# Patient Record
Sex: MALE | Race: WHITE | NOT HISPANIC OR LATINO | Employment: FULL TIME | URBAN - METROPOLITAN AREA
[De-identification: names, ages, dates, MRNs, and addresses within clinical notes are randomized per-mention and may not be internally consistent; named-entity substitution may affect disease eponyms.]

---

## 2017-03-09 ENCOUNTER — PATIENT MESSAGE (OUTPATIENT)
Dept: INTERNAL MEDICINE | Facility: CLINIC | Age: 27
End: 2017-03-09

## 2017-03-09 ENCOUNTER — HOSPITAL ENCOUNTER (OUTPATIENT)
Dept: RADIOLOGY | Facility: HOSPITAL | Age: 27
Discharge: HOME | End: 2017-03-09
Attending: FAMILY MEDICINE
Payer: COMMERCIAL

## 2017-03-09 ENCOUNTER — OFFICE VISIT (OUTPATIENT)
Dept: INTERNAL MEDICINE | Facility: CLINIC | Age: 27
End: 2017-03-09
Payer: COMMERCIAL

## 2017-03-09 VITALS
SYSTOLIC BLOOD PRESSURE: 126 MMHG | HEART RATE: 74 BPM | DIASTOLIC BLOOD PRESSURE: 80 MMHG | BODY MASS INDEX: 26.4 KG/M2 | TEMPERATURE: 98 F | RESPIRATION RATE: 16 BRPM | HEIGHT: 66 IN | WEIGHT: 164.25 LBS

## 2017-03-09 DIAGNOSIS — M79.672 LEFT FOOT PAIN: Primary | ICD-10-CM

## 2017-03-09 DIAGNOSIS — M79.672 LEFT FOOT PAIN: ICD-10-CM

## 2017-03-09 PROCEDURE — 99213 OFFICE O/P EST LOW 20 MIN: CPT | Mod: S$GLB,,, | Performed by: FAMILY MEDICINE

## 2017-03-09 PROCEDURE — 73630 X-RAY EXAM OF FOOT: CPT | Mod: 26,LT,, | Performed by: RADIOLOGY

## 2017-03-09 PROCEDURE — 99999 PR PBB SHADOW E&M-EST. PATIENT-LVL III: CPT | Mod: PBBFAC,,, | Performed by: FAMILY MEDICINE

## 2017-03-09 PROCEDURE — 1160F RVW MEDS BY RX/DR IN RCRD: CPT | Mod: S$GLB,,, | Performed by: FAMILY MEDICINE

## 2017-03-09 PROCEDURE — 73630 X-RAY EXAM OF FOOT: CPT | Mod: TC,PO,LT

## 2017-03-09 RX ORDER — IBUPROFEN 200 MG
400 TABLET ORAL EVERY 6 HOURS PRN
COMMUNITY
End: 2017-04-28

## 2017-03-09 RX ORDER — DICLOFENAC SODIUM 75 MG/1
75 TABLET, DELAYED RELEASE ORAL 2 TIMES DAILY
Qty: 30 TABLET | Refills: 1 | Status: SHIPPED | OUTPATIENT
Start: 2017-03-09 | End: 2017-04-28 | Stop reason: ALTCHOICE

## 2017-03-09 NOTE — PROGRESS NOTES
"Subjective:   Patient ID: Amanuel Jimenez is a 27 y.o. male.    Chief Complaint: other (left foot pain x 5 days)      Foot Injury    The incident occurred 5 to 7 days ago. The incident occurred in the street. There was no injury mechanism. The pain is present in the left foot. The pain is at a severity of 6/10. The pain is moderate. The pain has been improving since onset. Associated symptoms include an inability to bear weight. Pertinent negatives include no loss of motion, loss of sensation, muscle weakness, numbness or tingling. The symptoms are aggravated by movement, palpation and weight bearing. He has tried ice and NSAIDs for the symptoms. The treatment provided mild relief.       Patient queried and denies any further complaints.      ALLERGIES AND MEDICATIONS: updated and reviewed.  Review of patient's allergies indicates:   Allergen Reactions    Tylenol [acetaminophen] Nausea Only       Current Outpatient Prescriptions:     ibuprofen (ADVIL,MOTRIN) 200 MG tablet, Take 400 mg by mouth every 6 (six) hours as needed for Pain., Disp: , Rfl:     diclofenac (VOLTAREN) 75 MG EC tablet, Take 1 tablet (75 mg total) by mouth 2 (two) times daily., Disp: 30 tablet, Rfl: 1    Review of Systems   Constitutional: Negative for chills and fever.   Respiratory: Negative for choking and shortness of breath.    Cardiovascular: Negative for chest pain and leg swelling.   Musculoskeletal: Negative for back pain.   Neurological: Negative for tingling and numbness.       Objective:     Vitals:    03/09/17 1004   BP: 126/80   Pulse: 74   Resp: 16   Temp: 98.1 °F (36.7 °C)   TempSrc: Oral   Weight: 74.5 kg (164 lb 3.9 oz)   Height: 5' 5.5" (1.664 m)   PainSc:   3   PainLoc: Foot     Body mass index is 26.92 kg/(m^2).    Physical Exam   Constitutional: He appears well-developed and well-nourished.   HENT:   Head: Normocephalic and atraumatic.   Right Ear: External ear normal.   Left Ear: External ear normal.   Nose: Nose normal. "   Mouth/Throat: Oropharynx is clear and moist.   Cardiovascular: Normal rate and regular rhythm.    Pulmonary/Chest: Effort normal and breath sounds normal.   Musculoskeletal:        Right ankle: Normal.        Left ankle: Normal.        Right foot: Normal.        Left foot: Normal.   Nursing note and vitals reviewed.      Assessment and Plan:   Amanuel ARCHIBALD was seen today for other.    Diagnoses and all orders for this visit:    Left foot pain  -     Cancel: X-Ray Foot 2 View Left; Future    Other orders  -     diclofenac (VOLTAREN) 75 MG EC tablet; Take 1 tablet (75 mg total) by mouth 2 (two) times daily.        Return in about 1 week (around 3/16/2017), or if symptoms worsen or fail to improve.    THIS NOTE WILL BE SHARED WITH THE PATIENT.

## 2017-03-24 DIAGNOSIS — Z00.00 ROUTINE GENERAL MEDICAL EXAMINATION AT A HEALTH CARE FACILITY: Primary | ICD-10-CM

## 2017-04-18 ENCOUNTER — LAB VISIT (OUTPATIENT)
Dept: LAB | Facility: HOSPITAL | Age: 27
End: 2017-04-18
Attending: FAMILY MEDICINE
Payer: COMMERCIAL

## 2017-04-18 DIAGNOSIS — Z00.00 ROUTINE GENERAL MEDICAL EXAMINATION AT A HEALTH CARE FACILITY: ICD-10-CM

## 2017-04-18 LAB
ALBUMIN SERPL BCP-MCNC: 4 G/DL
ALP SERPL-CCNC: 81 U/L
ALT SERPL W/O P-5'-P-CCNC: 42 U/L
ANION GAP SERPL CALC-SCNC: 11 MMOL/L
AST SERPL-CCNC: 24 U/L
BASOPHILS # BLD AUTO: 0.04 K/UL
BASOPHILS NFR BLD: 0.4 %
BILIRUB SERPL-MCNC: 0.4 MG/DL
BUN SERPL-MCNC: 11 MG/DL
CALCIUM SERPL-MCNC: 9.2 MG/DL
CHLORIDE SERPL-SCNC: 108 MMOL/L
CHOLEST/HDLC SERPL: 5.6 {RATIO}
CO2 SERPL-SCNC: 22 MMOL/L
CREAT SERPL-MCNC: 1 MG/DL
DIFFERENTIAL METHOD: ABNORMAL
EOSINOPHIL # BLD AUTO: 0.6 K/UL
EOSINOPHIL NFR BLD: 6.7 %
ERYTHROCYTE [DISTWIDTH] IN BLOOD BY AUTOMATED COUNT: 13.7 %
EST. GFR  (AFRICAN AMERICAN): >60 ML/MIN/1.73 M^2
EST. GFR  (NON AFRICAN AMERICAN): >60 ML/MIN/1.73 M^2
GLUCOSE SERPL-MCNC: 98 MG/DL
HCT VFR BLD AUTO: 45.3 %
HDL/CHOLESTEROL RATIO: 17.9 %
HDLC SERPL-MCNC: 218 MG/DL
HDLC SERPL-MCNC: 39 MG/DL
HGB BLD-MCNC: 15.3 G/DL
LDLC SERPL CALC-MCNC: 146.4 MG/DL
LYMPHOCYTES # BLD AUTO: 2.4 K/UL
LYMPHOCYTES NFR BLD: 24.9 %
MCH RBC QN AUTO: 29.3 PG
MCHC RBC AUTO-ENTMCNC: 33.8 %
MCV RBC AUTO: 87 FL
MONOCYTES # BLD AUTO: 0.6 K/UL
MONOCYTES NFR BLD: 6.4 %
NEUTROPHILS # BLD AUTO: 5.8 K/UL
NEUTROPHILS NFR BLD: 61.2 %
NONHDLC SERPL-MCNC: 179 MG/DL
PLATELET # BLD AUTO: 248 K/UL
PMV BLD AUTO: 11.6 FL
POTASSIUM SERPL-SCNC: 4.4 MMOL/L
PROT SERPL-MCNC: 6.8 G/DL
RBC # BLD AUTO: 5.22 M/UL
SODIUM SERPL-SCNC: 141 MMOL/L
T4 FREE SERPL-MCNC: 1.02 NG/DL
TRIGL SERPL-MCNC: 163 MG/DL
TSH SERPL DL<=0.005 MIU/L-ACNC: 1.56 UIU/ML
WBC # BLD AUTO: 9.46 K/UL

## 2017-04-18 PROCEDURE — 80061 LIPID PANEL: CPT

## 2017-04-18 PROCEDURE — 84439 ASSAY OF FREE THYROXINE: CPT

## 2017-04-18 PROCEDURE — 84443 ASSAY THYROID STIM HORMONE: CPT

## 2017-04-18 PROCEDURE — 36415 COLL VENOUS BLD VENIPUNCTURE: CPT | Mod: PO

## 2017-04-18 PROCEDURE — 85025 COMPLETE CBC W/AUTO DIFF WBC: CPT

## 2017-04-18 PROCEDURE — 80053 COMPREHEN METABOLIC PANEL: CPT

## 2017-04-28 ENCOUNTER — OFFICE VISIT (OUTPATIENT)
Dept: INTERNAL MEDICINE | Facility: CLINIC | Age: 27
End: 2017-04-28
Payer: COMMERCIAL

## 2017-04-28 VITALS
HEART RATE: 76 BPM | BODY MASS INDEX: 28.02 KG/M2 | HEIGHT: 65 IN | TEMPERATURE: 98 F | SYSTOLIC BLOOD PRESSURE: 120 MMHG | DIASTOLIC BLOOD PRESSURE: 70 MMHG | WEIGHT: 168.19 LBS

## 2017-04-28 DIAGNOSIS — K61.0 PERIANAL ABSCESS: Primary | ICD-10-CM

## 2017-04-28 PROCEDURE — 1160F RVW MEDS BY RX/DR IN RCRD: CPT | Mod: S$GLB,,, | Performed by: FAMILY MEDICINE

## 2017-04-28 PROCEDURE — 99999 PR PBB SHADOW E&M-EST. PATIENT-LVL III: CPT | Mod: PBBFAC,,, | Performed by: FAMILY MEDICINE

## 2017-04-28 PROCEDURE — 99213 OFFICE O/P EST LOW 20 MIN: CPT | Mod: S$GLB,,, | Performed by: FAMILY MEDICINE

## 2017-04-28 RX ORDER — SULFAMETHOXAZOLE AND TRIMETHOPRIM 800; 160 MG/1; MG/1
1 TABLET ORAL 2 TIMES DAILY
Qty: 28 TABLET | Refills: 0 | Status: SHIPPED | OUTPATIENT
Start: 2017-04-28 | End: 2017-05-12

## 2017-05-01 NOTE — PROGRESS NOTES
"Subjective:   Patient ID: Amanuel Jimenez is a 27 y.o. male.    Chief Complaint: Mass (on he buttock)      HPI  28 yo male with "sore" near anus. Pain has improved significantly but he is concerned. No history of anal problems. No diarrhea. No fever or chills. No melena or hematochezia.  Patient queried and denies any further complaints.      ALLERGIES AND MEDICATIONS: updated and reviewed.  Review of patient's allergies indicates:   Allergen Reactions    Tylenol [acetaminophen] Nausea Only       Current Outpatient Prescriptions:     sulfamethoxazole-trimethoprim 800-160mg (BACTRIM DS) 800-160 mg Tab, Take 1 tablet by mouth 2 (two) times daily., Disp: 28 tablet, Rfl: 0    Review of Systems   Constitutional: Negative for activity change, appetite change, chills, diaphoresis, fatigue, fever and unexpected weight change.   HENT: Negative for congestion, ear discharge, ear pain, postnasal drip, rhinorrhea, sneezing and sore throat.    Eyes: Negative for photophobia and discharge.   Respiratory: Negative for cough, chest tightness, shortness of breath and wheezing.    Cardiovascular: Negative for chest pain and palpitations.   Gastrointestinal: Negative for abdominal distention, abdominal pain, diarrhea, nausea and vomiting.   Genitourinary: Negative for dysuria.   Musculoskeletal: Negative for arthralgias and neck pain.   Skin: Negative for rash.   Neurological: Negative for headaches.       Objective:     Vitals:    04/28/17 1354   BP: 120/70   Pulse: 76   Temp: 98.2 °F (36.8 °C)   TempSrc: Oral   Weight: 76.3 kg (168 lb 3.4 oz)   Height: 5' 5" (1.651 m)   PainSc: 0-No pain     Body mass index is 27.99 kg/(m^2).    Physical Exam   Constitutional: He appears well-developed and well-nourished.   HENT:   Head: Normocephalic and atraumatic.   Right Ear: Hearing, tympanic membrane, external ear and ear canal normal. No drainage or swelling. No decreased hearing is noted.   Left Ear: Hearing, tympanic membrane, external " ear and ear canal normal. No drainage or swelling. No decreased hearing is noted.   Nose: Nose normal. No rhinorrhea.   Mouth/Throat: Oropharynx is clear and moist. No oropharyngeal exudate, posterior oropharyngeal edema or posterior oropharyngeal erythema.   Eyes: Conjunctivae, EOM and lids are normal. Pupils are equal, round, and reactive to light. Right eye exhibits no discharge and no exudate. Left eye exhibits no discharge and no exudate. Right conjunctiva is not injected. Left conjunctiva is not injected.   Neck: Trachea normal and full passive range of motion without pain. Normal carotid pulses, no hepatojugular reflux and no JVD present. Carotid bruit is not present. No rigidity. No edema and no erythema present. No thyroid mass and no thyromegaly present.   Cardiovascular: Normal rate, regular rhythm and normal heart sounds.    Pulmonary/Chest: Effort normal. No respiratory distress.   Abdominal: Soft. Normal appearance and bowel sounds are normal. There is no tenderness. There is negative Charlton's sign.   Genitourinary:   Genitourinary Comments: Resolving lesion which appears to have been a small abscess   Lymphadenopathy:     He has no cervical adenopathy.   Neurological: He is alert.   Skin: Skin is warm and dry.   Psychiatric: He has a normal mood and affect. His speech is normal and behavior is normal.       Assessment and Plan:   Amanuel ARCHIBALD was seen today for mass.    Diagnoses and all orders for this visit:    Perianal abscess  -     Ambulatory consult to General Surgery    Other orders  -     sulfamethoxazole-trimethoprim 800-160mg (BACTRIM DS) 800-160 mg Tab; Take 1 tablet by mouth 2 (two) times daily.        No Follow-up on file.    THIS NOTE WILL BE SHARED WITH THE PATIENT.

## 2017-05-02 RX ORDER — HYDROCODONE BITARTRATE AND ACETAMINOPHEN 7.5; 325 MG/1; MG/1
1 TABLET ORAL EVERY 6 HOURS PRN
Qty: 20 TABLET | Refills: 0 | Status: ON HOLD | OUTPATIENT
Start: 2017-05-02 | End: 2017-05-15

## 2017-05-03 ENCOUNTER — TELEPHONE (OUTPATIENT)
Dept: INTERNAL MEDICINE | Facility: CLINIC | Age: 27
End: 2017-05-03

## 2017-05-03 NOTE — TELEPHONE ENCOUNTER
----- Message from Chey Mcdowell sent at 5/3/2017  2:29 PM CDT -----  Contact: Patient  The patient was seen on 4/28/17 for a mass on his buttocks.  He says it bursted last night.  He would like to know if he still needs to go see the surgeon.  Please call him at [phone].    Thanks!

## 2017-05-04 ENCOUNTER — OFFICE VISIT (OUTPATIENT)
Dept: SURGERY | Facility: CLINIC | Age: 27
End: 2017-05-04
Payer: COMMERCIAL

## 2017-05-04 VITALS
WEIGHT: 169.19 LBS | OXYGEN SATURATION: 100 % | HEART RATE: 80 BPM | SYSTOLIC BLOOD PRESSURE: 127 MMHG | TEMPERATURE: 98 F | HEIGHT: 65 IN | BODY MASS INDEX: 28.19 KG/M2 | DIASTOLIC BLOOD PRESSURE: 63 MMHG

## 2017-05-04 DIAGNOSIS — F17.200 SMOKER: ICD-10-CM

## 2017-05-04 DIAGNOSIS — K61.0 PERIANAL ABSCESS: Primary | ICD-10-CM

## 2017-05-04 DIAGNOSIS — E66.3 OVERWEIGHT (BMI 25.0-29.9): ICD-10-CM

## 2017-05-04 PROCEDURE — 99999 PR PBB SHADOW E&M-EST. PATIENT-LVL III: CPT | Mod: PBBFAC,,, | Performed by: SURGERY

## 2017-05-04 PROCEDURE — 1160F RVW MEDS BY RX/DR IN RCRD: CPT | Mod: S$GLB,,, | Performed by: SURGERY

## 2017-05-04 PROCEDURE — 99204 OFFICE O/P NEW MOD 45 MIN: CPT | Mod: S$GLB,,, | Performed by: SURGERY

## 2017-05-04 NOTE — LETTER
May 6, 2017      Daniel Mora MD  [address] [facility]  6th [address]  [place] [address]           [facility]  [address]  4th Floor [facility] [address]  Phone: [phone]          Patient: Amanuel Jimenez   MR Number: [id number]   YOB: 1990   Date of Visit: 5/4/2017       Dear Dr. Daniel Mora:    Thank you for referring Amanuel Jimenez to me for evaluation. Attached you will find relevant portions of my assessment and plan of care.    If you have questions, please do not hesitate to call me. I look forward to following Amanuel Jimenez along with you.    Sincerely,    Elis Jeong,     Enclosure  CC:  No Recipients    If you would like to receive this communication electronically, please contact [email or web address] or [phone] to request more information on [facility] [employer] access.    For providers and/or their staff who would like to refer a patient to [facility], please contact us through our one-stop-shop provider referral line, Fiorella Fay, at [phone].    If you feel you have received this communication in error or would no longer like to receive these types of communications, please e-mail [email or web address]

## 2017-05-04 NOTE — MR AVS SNAPSHOT
"    [facility]  [address] [place] [address]  4th Floor [place] [address]  Phone: [phone]                  Amanuel Jimenez   2017 10:20 AM   Office Visit    Description:  Male : 1990   Provider:  Elis Jeong DO   Department:  [facility]           Reason for Visit     Consult                To Do List           Goals (5 Years of Data)     None      [facility]     [facility] Care [facility] -  Assistance  Unless otherwise directed by your provider, please contact [facility], our nurse care line that is available for  assistance.     Registered nurses in the [facility] Center provide: appointment scheduling, clinical advisement, health education, and other advisory services.  Call: [phone] (toll free)               Medications           Message regarding Medications     Verify the changes and/or additions to your medication regime listed below are the same as discussed with your clinician today.  If any of these changes or additions are incorrect, please notify your healthcare provider.             Verify that the below list of medications is an accurate representation of the medications you are currently taking.  If none reported, the list may be blank. If incorrect, please contact your healthcare provider. Carry this list with you in case of emergency.           Current Medications     hydrocodone-acetaminophen 7.5-325mg (NORCO) 7.5-325 mg per tablet Take 1 tablet by mouth every 6 (six) hours as needed for Pain.    sulfamethoxazole-trimethoprim 800-160mg (BACTRIM DS) 800-160 mg Tab Take 1 tablet by mouth 2 (two) times daily.           Clinical Reference Information           Your Vitals Were     BP Pulse Temp Height Weight SpO2    127/63 80 98.1 °F (36.7 °C) 5' 5" (1.651 m) 76.8 kg (169 lb 3.3 oz) 100%    BMI                28.16 kg/m2          Blood Pressure          Most Recent Value    BP  127/63      Allergies as of " 5/4/2017     Tylenol [Acetaminophen]      Immunizations Administered on Date of Encounter - 5/4/2017     None      Smoking Cessation     If you would like to quit smoking:   You may be eligible for free services if you are a [place] resident and started smoking cigarettes before September 1, 1988.  Call the Smoking Cessation Trust (SCT) toll free at (600) 461-2539 or (380) 194-7420.   Call 1-800-QUIT-NOW if you do not meet the above criteria.   Contact us via email: [email or web address]   View our website for more information: [email or web address]        Language Assistance Services     ATTENTION: Language assistance services are available, free of charge. Please call [phone].      ATENCIÓN: Si asherla itz, tiene a baig disposición servicios gratuitos de asistencia lingüística. Llame al [phone].     CHÚ Ý: N?u b?n nói Ti?ng Vi?t, có các d?ch v? h? tr? ngôn ng? mi?n phí dành cho b?n. G?i s? [phone].         [facility] complies with applicable Federal civil rights laws and does not discriminate on the basis of race, color, national origin, age, disability, or sex.

## 2017-05-07 NOTE — PROGRESS NOTES
Subjective:      Patient ID: Amanuel Jimenez is a 27 y.o. male.    Chief Complaint: Consult   27-year-old otherwise healthy male presents for evaluation of perianal abscess.  He felt a lump in the perianal region after Easter, he believes April 17.  He initially thought it was hemorrhoids and he applied hemorrhoidal cream (Preparation H) this did not help and then he began applying Neosporin.  He noticed an increase in size in the lesion with corresponding increasing discomfort.  He then presented to his primary care doctor last Friday and antibiotics were prescribed.  2 days prior to his presentation today, the lesion began to spontaneously drain.  The mass has resolved and the pain is much improved.  There is rare drainage.  He is still taking antibiotics.  No fevers or chills presently, although he thought he may have had fevers and chills with no lesion when it was at its greatest size.  No nausea or vomiting.  He works as a .    History reviewed. No pertinent past medical history.  History reviewed. No pertinent surgical history.  History reviewed. No pertinent family history.  Social History     Social History    Marital status: Legally      Spouse name: N/A    Number of children: N/A    Years of education: N/A     Social History Main Topics    Smoking status: Current Every Day Smoker     Packs/day: 0.50     Types: Cigarettes    Smokeless tobacco: None    Alcohol use None    Drug use: None    Sexual activity: Not Asked     Other Topics Concern    None     Social History Narrative       Current Outpatient Prescriptions   Medication Sig Dispense Refill    hydrocodone-acetaminophen 7.5-325mg (NORCO) 7.5-325 mg per tablet Take 1 tablet by mouth every 6 (six) hours as needed for Pain. 20 tablet 0    sulfamethoxazole-trimethoprim 800-160mg (BACTRIM DS) 800-160 mg Tab Take 1 tablet by mouth 2 (two) times daily. 28 tablet 0     No current facility-administered medications for  "this visit.      Review of patient's allergies indicates:   Allergen Reactions    Tylenol [acetaminophen] Nausea Only       ROS:  All systems were reviewed and are negative, except that mentioned in the HPI.    Objective:     Vitals:    05/04/17 1035   BP: 127/63   Pulse: 80   Temp: 98.1 °F (36.7 °C)   SpO2: 100%   Weight: 76.8 kg (169 lb 3.3 oz)   Height: 5' 5" (1.651 m)     Physical Exam   Constitutional: He is oriented to person, place, and time. He appears well-developed and well-nourished. No distress.   HENT:   Head: Normocephalic and atraumatic.   Eyes: Conjunctivae and EOM are normal. Pupils are equal, round, and reactive to light. No scleral icterus.   Neck: Normal range of motion. Neck supple. No JVD present.   Cardiovascular: Normal rate and regular rhythm.    Pulmonary/Chest: Effort normal and breath sounds normal. No respiratory distress.   Abdominal: Soft. Bowel sounds are normal. He exhibits no distension.   Genitourinary:   Genitourinary Comments: 5-7 mm skin opening in the left perianal region, tender to palpation, no active drainage, no fluctuance, no crepitus, trace erythema at the actual opening.  I was unable to probe the wound due to the sensitivity of the patient.   Musculoskeletal: Normal range of motion. He exhibits no edema or tenderness.   Neurological: He is alert and oriented to person, place, and time. No cranial nerve deficit.   Skin: Skin is warm and dry. He is not diaphoretic.   Psychiatric: He has a normal mood and affect.       Lab Review: CBC:   Lab Results   Component Value Date    WBC 9.46 04/18/2017    RBC 5.22 04/18/2017    HGB 15.3 04/18/2017    HCT 45.3 04/18/2017     04/18/2017     BMP:   Lab Results   Component Value Date    GLU 98 04/18/2017     04/18/2017    K 4.4 04/18/2017     04/18/2017    CO2 22 (L) 04/18/2017    BUN 11 04/18/2017    CREATININE 1.0 04/18/2017    CALCIUM 9.2 04/18/2017     Lab Results   Component Value Date    ALT 42 04/18/2017    " AST 24 04/18/2017    ALKPHOS 81 04/18/2017    BILITOT 0.4 04/18/2017       Diagnostics Review:  n/a    Assessment:     1. Perianal abscess    2. Smoker    3. Overweight (BMI 25.0-29.9)      Plan:      The perianal abscess has spontaneously drained and the residual wound appears to be healing well.  I recommended that he apply warm compresses, or do warm soaks several times throughout the day.  He should complete his course of antibiotics.  Loosefitting cotton clothes were recommended.  He was told to keep the area clean and dry.  Follow-up appointment was offered, patient declined stating he'll follow-up with us as needed.  All of his questions were answered.  Smoking cessation is encouraged.

## 2017-05-12 ENCOUNTER — TELEPHONE (OUTPATIENT)
Dept: SURGERY | Facility: CLINIC | Age: 27
End: 2017-05-12

## 2017-05-12 ENCOUNTER — OFFICE VISIT (OUTPATIENT)
Dept: SURGERY | Facility: CLINIC | Age: 27
End: 2017-05-12
Payer: COMMERCIAL

## 2017-05-12 VITALS
HEART RATE: 73 BPM | SYSTOLIC BLOOD PRESSURE: 124 MMHG | BODY MASS INDEX: 27.78 KG/M2 | DIASTOLIC BLOOD PRESSURE: 70 MMHG | HEIGHT: 65 IN | WEIGHT: 166.75 LBS | TEMPERATURE: 98 F

## 2017-05-12 DIAGNOSIS — K62.9 RECTAL LESION: Primary | ICD-10-CM

## 2017-05-12 PROCEDURE — 1160F RVW MEDS BY RX/DR IN RCRD: CPT | Mod: S$GLB,,, | Performed by: SURGERY

## 2017-05-12 PROCEDURE — 99214 OFFICE O/P EST MOD 30 MIN: CPT | Mod: 57,S$GLB,, | Performed by: SURGERY

## 2017-05-12 PROCEDURE — 99999 PR PBB SHADOW E&M-EST. PATIENT-LVL IV: CPT | Mod: PBBFAC,,, | Performed by: SURGERY

## 2017-05-12 RX ORDER — METRONIDAZOLE 500 MG/100ML
500 INJECTION, SOLUTION INTRAVENOUS
Status: CANCELLED | OUTPATIENT
Start: 2017-05-12

## 2017-05-12 RX ORDER — SODIUM CHLORIDE 9 MG/ML
INJECTION, SOLUTION INTRAVENOUS CONTINUOUS
Status: CANCELLED | OUTPATIENT
Start: 2017-05-12

## 2017-05-12 NOTE — TELEPHONE ENCOUNTER
----- Message from Jennaaddison Oreilly sent at 5/12/2017  4:04 PM CDT -----  Contact: [phone]/self  Patient requesting to speak with you regarding time his surgery. Please advise.    05/12/17  1611  Spoke to pt who states he was wondering when he was going to get the call to come in for surgery. Pt informed that someone would give him a call, and that I had spoken to the surgical coordinator who states the nurse was making her way down the list, and that he would be receiving a call. Pt states will someone call me before Monday. I assured pt that someone would give him a call with time of arrival before Monday. Pt verbalized understanding.

## 2017-05-12 NOTE — PROGRESS NOTES
History & Physical    SUBJECTIVE:     History of Present Illness:  Patient known to Dr. Jeong, seen 5/4 with rectal complaints, concern of abscess spontaneously draining. Today was his last day of Bactrim.     This morning he woke up with increased pain and concern of recurrence.  This has been ongoing since 4/17.  The pain is described as throbbing, and is 4/10 in intensity. The patient is experiencing rectal pain without radiation. Onset was a few days ago. Symptoms have been gradually worsening. Aggravating factors: pressure.  Alleviating factors: none. Associated symptoms: none. The patient denies constipation, diarrhea, dysuria, hematochezia, hematuria, nausea and vomiting.    Last bm today, no issues  No family history of IBD.   + tobacco use                Chief Complaint   Patient presents with    Rectal Pain       Review of patient's allergies indicates:   Allergen Reactions    Tylenol [acetaminophen] Nausea Only       Current Outpatient Prescriptions   Medication Sig Dispense Refill    hydrocodone-acetaminophen 7.5-325mg (NORCO) 7.5-325 mg per tablet Take 1 tablet by mouth every 6 (six) hours as needed for Pain. 20 tablet 0    sulfamethoxazole-trimethoprim 800-160mg (BACTRIM DS) 800-160 mg Tab Take 1 tablet by mouth 2 (two) times daily. 28 tablet 0     No current facility-administered medications for this visit.        History reviewed. No pertinent past medical history.  History reviewed. No pertinent surgical history.  History reviewed. No pertinent family history.  Social History   Substance Use Topics    Smoking status: Current Every Day Smoker     Packs/day: 0.50     Types: Cigarettes    Smokeless tobacco: None    Alcohol use None          Review of Systems   Constitutional: Negative for chills and fever.   HENT: Negative for congestion and sore throat.    Eyes: Negative for photophobia and visual disturbance.   Respiratory: Negative for cough and shortness of breath.    Cardiovascular:  "Negative for chest pain and palpitations.   Gastrointestinal: Positive for rectal pain. Negative for anal bleeding, blood in stool, constipation and diarrhea.   Genitourinary: Negative for dysuria, frequency and hematuria.   Musculoskeletal: Negative for back pain and gait problem.   Skin: Negative for rash and wound.   Neurological: Negative for seizures and headaches.   Hematological: Negative for adenopathy. Does not bruise/bleed easily.   Psychiatric/Behavioral: Negative for sleep disturbance. The patient is not nervous/anxious.        OBJECTIVE:     Vital Signs (Most Recent)  Temp: 98.2 °F (36.8 °C) (05/12/17 0905)  Pulse: 73 (05/12/17 0905)  BP: 124/70 (05/12/17 0905)  5' 5" (1.651 m)  75.7 kg (166 lb 12.5 oz)     Physical Exam   Constitutional: He is oriented to person, place, and time. He appears well-developed and well-nourished. No distress.   HENT:   Head: Normocephalic and atraumatic.   Eyes: Conjunctivae and EOM are normal. No scleral icterus.   Neck: Normal range of motion.   Cardiovascular: Normal rate and intact distal pulses.    Pulmonary/Chest: Effort normal. No stridor. No respiratory distress.   Abdominal: Soft. He exhibits no distension. There is no tenderness.   Genitourinary:   Genitourinary Comments: Left lateral location is nodularity without overlying skin changes and no fluctuance or drainage    KRISSY performed with grade 1 internal hemorrhoids noted, distal anal canal left lateral location is an additional nodularity which is tender and fluctuant   Musculoskeletal: Normal range of motion. He exhibits no edema or tenderness.   Neurological: He is alert and oriented to person, place, and time.   Skin: No rash noted. He is not diaphoretic. No pallor.   Psychiatric: He has a normal mood and affect. His behavior is normal.       Laboratory  n/a    Diagnostic Results:  n/a    ASSESSMENT/PLAN:   28 yo male with rectal abscess, concern of fistula- first presentation. No family history of IBD " however his age and tobacco use and left lateral fistula warrants EUA to appropriately define pathology.     Will stop abx to allow evolution of pathology, currently no evidence of infection.   Risks of surgery discussed including bleeding, infection, pain, scarring, wound complications, injury to local structures, and need for further surgery.  The patient demonstrated understanding of risks and consent signed.   Enema in PReop

## 2017-05-12 NOTE — TELEPHONE ENCOUNTER
05/12/17  0826  Spoke to pt who states, he will be here for 9am to see Dr. Sharif. Appt made for 9am on 05/12/17.

## 2017-05-12 NOTE — MR AVS SNAPSHOT
"    [facility]  [address] [place] [address]  4th [address] [place] [address]  Phone: [phone]                  Amanuel Jimenez   2017 9:00 AM   Office Visit    Description:  Male : 1990   Provider:  Kassidy Sharif MD   Department:  [facility]                To Do List           Goals (5 Years of Data)     None      [facility]     [facility] -  Assistance  Unless otherwise directed by your provider, please contact [facility], our nurse care line that is available for  assistance.     Registered nurses in the [facility] provide: appointment scheduling, clinical advisement, health education, and other advisory services.  Call: [phone] (toll free)               Medications           Message regarding Medications     Verify the changes and/or additions to your medication regime listed below are the same as discussed with your clinician today.  If any of these changes or additions are incorrect, please notify your healthcare provider.             Verify that the below list of medications is an accurate representation of the medications you are currently taking.  If none reported, the list may be blank. If incorrect, please contact your healthcare provider. Carry this list with you in case of emergency.           Current Medications     hydrocodone-acetaminophen 7.5-325mg (NORCO) 7.5-325 mg per tablet Take 1 tablet by mouth every 6 (six) hours as needed for Pain.    sulfamethoxazole-trimethoprim 800-160mg (BACTRIM DS) 800-160 mg Tab Take 1 tablet by mouth 2 (two) times daily.           Clinical Reference Information           Your Vitals Were     BP Pulse Temp Height Weight BMI    124/70 (BP Location: Left arm, Patient Position: Sitting) 73 98.2 °F (36.8 °C) (Oral) 5' 5" (1.651 m) 75.7 kg (166 lb 12.5 oz) 27.75 kg/m2      Blood Pressure          Most Recent Value    BP  124/70      Allergies as of 2017     Tylenol " [Acetaminophen]      Immunizations Administered on Date of Encounter - 5/12/2017     None      Smoking Cessation     If you would like to quit smoking:   You may be eligible for free services if you are a [place] resident and started smoking cigarettes before September 1, 1988.  Call the Smoking Cessation Trust (SCT) toll free at (564) 176-3782 or (510) 271-4194.   Call 1-800-QUIT-NOW if you do not meet the above criteria.   Contact us via email: [email or web address]   View our website for more information: [email or web address]        Language Assistance Services     ATTENTION: Language assistance services are available, free of charge. Please call [phone].      ATENCIÓN: Si habla itz, tiene a baig disposición servicios gratuitos de asistencia lingüística. Llame al [phone].     CHÚ Ý: N?u b?n nói Ti?ng Vi?t, có các d?ch v? h? tr? ngôn ng? mi?n phí dành cho b?n. G?i s? [phone].         [facility] complies with applicable Federal civil rights laws and does not discriminate on the basis of race, color, national origin, age, disability, or sex.

## 2017-05-12 NOTE — TELEPHONE ENCOUNTER
----- Message from Lovely Asher sent at 5/12/2017  7:03 AM CDT -----  Contact: self/[phone]  Patient would like to be seen today for a pre anal abscess.  Advise patient that Dr. Jeong isn't available today and he would like to speak with someone.  Please advise     05/12/17  0805  Pt states that he spoke to Dr. Jeong last week regarding abscess, and was told that they would monitor it. Pt states that the abscess came back, and will probably need to be drained. Pt reminded that Dr. Jeong is not in today, but that Dr. Sharif would be able to see him. Pt also informed that Dr. Sharif is currently not in at the moment, but would give him a call as soon as she gets here due to pt stating that he lives approx. 10-15 mins away, and not wanting him to sit in the waiting room for an undetermined amount of time. Pt verbalized understanding, and states that he will wait for my call.

## 2017-05-14 ENCOUNTER — ANESTHESIA EVENT (OUTPATIENT)
Dept: SURGERY | Facility: HOSPITAL | Age: 27
End: 2017-05-14
Payer: COMMERCIAL

## 2017-05-14 NOTE — ANESTHESIA PREPROCEDURE EVALUATION
05/14/2017  Amanuel Jimenez is a 27 y.o., male EUA    Review of patient's allergies indicates:   Allergen Reactions    Tylenol [acetaminophen] Nausea Only     No past medical history on file.  No past surgical history on file.    Patient Active Problem List   Diagnosis    Rectal lesion     Patient Active Problem List   Diagnosis    Rectal lesion       Anesthesia Evaluation    I have reviewed the Patient Summary Reports.        Review of Systems    Wt Readings from Last 3 Encounters:   05/12/17 75.7 kg (166 lb 12.5 oz)   05/04/17 76.8 kg (169 lb 3.3 oz)   04/28/17 76.3 kg (168 lb 3.4 oz)     Temp Readings from Last 3 Encounters:   05/12/17 36.8 °C (98.2 °F) (Oral)   05/04/17 36.7 °C (98.1 °F)   04/28/17 36.8 °C (98.2 °F) (Oral)     BP Readings from Last 3 Encounters:   05/12/17 124/70   05/04/17 127/63   04/28/17 120/70     Pulse Readings from Last 3 Encounters:   05/12/17 73   05/04/17 80   04/28/17 76     Lab Results   Component Value Date    WBC 9.46 04/18/2017    HGB 15.3 04/18/2017    HCT 45.3 04/18/2017    MCV 87 04/18/2017     04/18/2017       Chemistry        Component Value Date/Time     04/18/2017 0757    K 4.4 04/18/2017 0757     04/18/2017 0757    CO2 22 (L) 04/18/2017 0757    BUN 11 04/18/2017 0757    CREATININE 1.0 04/18/2017 0757    GLU 98 04/18/2017 0757        Component Value Date/Time    CALCIUM 9.2 04/18/2017 0757    ALKPHOS 81 04/18/2017 0757    AST 24 04/18/2017 0757    ALT 42 04/18/2017 0757    BILITOT 0.4 04/18/2017 0757            Physical Exam  General:  Well nourished            Mental Status:  Mental Status Findings:         Anesthesia Plan  Type of Anesthesia, risks & benefits discussed:  Anesthesia Type:  MAC  Patient's Preference:   Intra-op Monitoring Plan:   Intra-op Monitoring Plan Comments:   Post Op Pain Control Plan:   Post Op Pain Control Plan  Comments:   Induction:   IV  Beta Blocker:         Informed Consent: Patient understands risks and agrees with Anesthesia plan.  Questions answered. Anesthesia consent signed with patient.  ASA Score: 1     Day of Surgery Review of History & Physical:    H&P update referred to the surgeon.         Ready For Surgery From Anesthesia Perspective.

## 2017-05-15 ENCOUNTER — HOSPITAL ENCOUNTER (OUTPATIENT)
Facility: HOSPITAL | Age: 27
Discharge: HOME | End: 2017-05-15
Attending: SURGERY | Admitting: SURGERY
Payer: COMMERCIAL

## 2017-05-15 ENCOUNTER — ANESTHESIA (OUTPATIENT)
Dept: SURGERY | Facility: HOSPITAL | Age: 27
End: 2017-05-15
Payer: COMMERCIAL

## 2017-05-15 DIAGNOSIS — K62.9 RECTAL LESION: ICD-10-CM

## 2017-05-15 PROCEDURE — 25000003 PHARM REV CODE 250: Performed by: SURGERY

## 2017-05-15 PROCEDURE — 25000003 PHARM REV CODE 250: Performed by: ANESTHESIOLOGY

## 2017-05-15 PROCEDURE — 63600175 PHARM REV CODE 636 W HCPCS: Performed by: NURSE ANESTHETIST, CERTIFIED REGISTERED

## 2017-05-15 PROCEDURE — 37000009 HC ANESTHESIA EA ADD 15 MINS: Performed by: SURGERY

## 2017-05-15 PROCEDURE — 36000704 HC OR TIME LEV I 1ST 15 MIN: Performed by: SURGERY

## 2017-05-15 PROCEDURE — 63600175 PHARM REV CODE 636 W HCPCS: Performed by: SURGERY

## 2017-05-15 PROCEDURE — 71000015 HC POSTOP RECOV 1ST HR: Performed by: SURGERY

## 2017-05-15 PROCEDURE — 25000003 PHARM REV CODE 250: Performed by: NURSE ANESTHETIST, CERTIFIED REGISTERED

## 2017-05-15 PROCEDURE — 71000016 HC POSTOP RECOV ADDL HR: Performed by: SURGERY

## 2017-05-15 PROCEDURE — 46020 PLACEMENT OF SETON: CPT | Mod: ,,, | Performed by: SURGERY

## 2017-05-15 PROCEDURE — 46050 I&D PERIANAL ABSCESS SUPFC: CPT | Mod: 51,,, | Performed by: SURGERY

## 2017-05-15 PROCEDURE — 37000008 HC ANESTHESIA 1ST 15 MINUTES: Performed by: SURGERY

## 2017-05-15 PROCEDURE — 36000705 HC OR TIME LEV I EA ADD 15 MIN: Performed by: SURGERY

## 2017-05-15 RX ORDER — FENTANYL CITRATE 50 UG/ML
INJECTION, SOLUTION INTRAMUSCULAR; INTRAVENOUS
Status: DISCONTINUED | OUTPATIENT
Start: 2017-05-15 | End: 2017-05-15

## 2017-05-15 RX ORDER — PROPOFOL 10 MG/ML
VIAL (ML) INTRAVENOUS CONTINUOUS PRN
Status: DISCONTINUED | OUTPATIENT
Start: 2017-05-15 | End: 2017-05-15

## 2017-05-15 RX ORDER — SODIUM CHLORIDE, SODIUM LACTATE, POTASSIUM CHLORIDE, CALCIUM CHLORIDE 600; 310; 30; 20 MG/100ML; MG/100ML; MG/100ML; MG/100ML
INJECTION, SOLUTION INTRAVENOUS CONTINUOUS
Status: DISCONTINUED | OUTPATIENT
Start: 2017-05-15 | End: 2017-05-15 | Stop reason: HOSPADM

## 2017-05-15 RX ORDER — LIDOCAINE HYDROCHLORIDE 20 MG/ML
JELLY TOPICAL
Status: DISCONTINUED | OUTPATIENT
Start: 2017-05-15 | End: 2017-05-15 | Stop reason: HOSPADM

## 2017-05-15 RX ORDER — MIDAZOLAM HYDROCHLORIDE 1 MG/ML
INJECTION INTRAMUSCULAR; INTRAVENOUS
Status: DISCONTINUED | OUTPATIENT
Start: 2017-05-15 | End: 2017-05-15

## 2017-05-15 RX ORDER — PROPOFOL 10 MG/ML
VIAL (ML) INTRAVENOUS
Status: DISCONTINUED | OUTPATIENT
Start: 2017-05-15 | End: 2017-05-15

## 2017-05-15 RX ORDER — METRONIDAZOLE 500 MG/100ML
500 INJECTION, SOLUTION INTRAVENOUS
Status: COMPLETED | OUTPATIENT
Start: 2017-05-15 | End: 2017-05-15

## 2017-05-15 RX ORDER — CEFTRIAXONE 2 G/50ML
2 INJECTION, SOLUTION INTRAVENOUS
Status: COMPLETED | OUTPATIENT
Start: 2017-05-15 | End: 2017-05-15

## 2017-05-15 RX ORDER — SODIUM CHLORIDE 9 MG/ML
INJECTION, SOLUTION INTRAVENOUS CONTINUOUS
Status: DISCONTINUED | OUTPATIENT
Start: 2017-05-15 | End: 2017-05-15 | Stop reason: HOSPADM

## 2017-05-15 RX ORDER — LIDOCAINE HCL/EPINEPHRINE/PF 2%-1:200K
VIAL (ML) INJECTION
Status: DISCONTINUED | OUTPATIENT
Start: 2017-05-15 | End: 2017-05-15 | Stop reason: HOSPADM

## 2017-05-15 RX ORDER — GLYCOPYRROLATE 0.2 MG/ML
INJECTION INTRAMUSCULAR; INTRAVENOUS
Status: DISCONTINUED | OUTPATIENT
Start: 2017-05-15 | End: 2017-05-15

## 2017-05-15 RX ORDER — LIDOCAINE HCL/PF 100 MG/5ML
SYRINGE (ML) INTRAVENOUS
Status: DISCONTINUED | OUTPATIENT
Start: 2017-05-15 | End: 2017-05-15

## 2017-05-15 RX ORDER — HYDROCODONE BITARTRATE AND ACETAMINOPHEN 7.5; 325 MG/1; MG/1
1 TABLET ORAL EVERY 4 HOURS PRN
Qty: 40 TABLET | Refills: 0 | Status: SHIPPED | OUTPATIENT
Start: 2017-05-15 | End: 2017-06-26 | Stop reason: SDUPTHER

## 2017-05-15 RX ORDER — LIDOCAINE HYDROCHLORIDE 10 MG/ML
INJECTION, SOLUTION EPIDURAL; INFILTRATION; INTRACAUDAL; PERINEURAL
Status: DISCONTINUED | OUTPATIENT
Start: 2017-05-15 | End: 2017-05-15 | Stop reason: HOSPADM

## 2017-05-15 RX ADMIN — MIDAZOLAM HYDROCHLORIDE 2 MG: 1 INJECTION, SOLUTION INTRAMUSCULAR; INTRAVENOUS at 11:05

## 2017-05-15 RX ADMIN — FENTANYL CITRATE 25 MCG: 50 INJECTION, SOLUTION INTRAMUSCULAR; INTRAVENOUS at 12:05

## 2017-05-15 RX ADMIN — PROPOFOL 40 MG: 10 INJECTION, EMULSION INTRAVENOUS at 12:05

## 2017-05-15 RX ADMIN — GLYCOPYRROLATE 0.2 MG: 0.2 INJECTION, SOLUTION INTRAMUSCULAR; INTRAVENOUS at 11:05

## 2017-05-15 RX ADMIN — METRONIDAZOLE 500 MG: 500 SOLUTION INTRAVENOUS at 12:05

## 2017-05-15 RX ADMIN — PROPOFOL 150 MCG/KG/MIN: 10 INJECTION, EMULSION INTRAVENOUS at 12:05

## 2017-05-15 RX ADMIN — SODIUM CHLORIDE, SODIUM LACTATE, POTASSIUM CHLORIDE, AND CALCIUM CHLORIDE: .6; .31; .03; .02 INJECTION, SOLUTION INTRAVENOUS at 11:05

## 2017-05-15 RX ADMIN — LIDOCAINE HYDROCHLORIDE 40 MG: 20 INJECTION, SOLUTION INTRAVENOUS at 12:05

## 2017-05-15 RX ADMIN — MIDAZOLAM HYDROCHLORIDE 3 MG: 1 INJECTION, SOLUTION INTRAMUSCULAR; INTRAVENOUS at 11:05

## 2017-05-15 RX ADMIN — CEFTRIAXONE 2 G: 2 INJECTION, SOLUTION INTRAVENOUS at 12:05

## 2017-05-15 NOTE — PLAN OF CARE
Problem: Patient Care Overview  Goal: Plan of Care Review  Outcome: Outcome(s) achieved Date Met:  05/15/17  Discharge instructions given with claimed understanding

## 2017-05-15 NOTE — OP NOTE
DATE OF PROCEDURE:  05/15/2017    PREOPERATIVE DIAGNOSIS:  Perineal abscess.    POSTOPERATIVE DIAGNOSIS:  Transsphincteric anal fistula.    PROCEDURE:  Exam under anesthesia, incision and drainage of abscess, placement   seton.    ANESTHESIA:  Local MAC.    PREP:  Betadine.    INDICATIONS:  The patient is a 27-year-old male who presents to clinic with a   recurring rectal abscess.  On exam, it was concerning for fistula.  Given the   limitations of office exam, exam under anesthesia was discussed and recommended.    The risks of this were discussed with the patient including bleeding,   infection, pain, scarring, wound complications, injury to local structures and   potential need for further surgery.  The patient demonstrated to understand   these risks and consent form was obtained.    PROCEDURE IN DETAIL:  The patient was identified in the Preoperative Unit and   taken back to the Operating Room and IV sedation was administered.  He was then   positioned into the prone position with appropriate padding in place.  IV   antibiotics were administered prior to the administration of anesthesia.    Approximately 15 mL of lidocaine anesthesia was injected into the pudendal nerve   area and circumferentially along the anal opening.  A digital rectal exam was   performed and an area of induration was noted at 11 o'clock in the left lateral   location.  There appeared to be an external opening and this was probed gently   with a lacrimal probe and a distal anal internal opening was then identified.  A   seton was looped through this and secured with a silk suture.  The abscess   cavity to allow adequate drainage given its size of approximately 3 cm was   opened slightly in a cruciate fashion.  This was done with a 15-blade scalpel.    The cavity and track were then curetted clean of necrotic debris.  Upon further   inspection with the speculum, a rectal ulceration was noted superior to the   fistula opening on the internal  aspect.  The area was friable.  There was no   biopsy taken at this time.  Once hemostasis was achieved, Gelfoam with lidocaine   was then placed into the distal anal canal and dry sterile dressing was   applied.  The patient was awakened from anesthesia without complication and   returned to the Postoperative Recovery Unit in stable condition.  At the end of   case, sponge, instrument and needle counts were correct on 2 occasions.  I was   present and scrubbed throughout the entirety of the case.    COMPLICATIONS:  None.    CONDITION:  Stable.    SPECIMEN:  None.      RUBEN/  dd: 05/15/2017 13:06:34 (CDT)  td: 05/15/2017 17:52:22 (CDT)  Doc ID   #[id number]  Job ID #[id number]    CC:

## 2017-05-15 NOTE — BRIEF OP NOTE
[facility]  Surgery Department  Operative Note    SUMMARY     Date of Procedure: 5/15/2017     Procedure: Procedure(s) (LRB):  EXAM UNDER ANESTHESIA rectal (N/A)  INCISION AND DRAINAGE (I&D), ABSCESS, rectal (N/A)  PLACEMENT-SETON DRAIN (N/A)     Surgeon(s) and Role:     * Kassidy Sharif MD - Primary    Assisting Surgeon: None    Pre-Operative Diagnosis: Rectal lesion [K62.9]    Post-Operative Diagnosis: Post-Op Diagnosis Codes:     * Rectal lesion [K62.9]    Anesthesia: Monitor Anesthesia Care    Technical Procedures Used:  open    Description of the Findings of the Procedure:  11 oclock fistula, transphincteric with ulceration distal anal canal    Significant Surgical Tasks Conducted by the Assistant(s), if Applicable:     Complications: No    Estimated Blood Loss (EBL): * No values recorded between 5/15/2017 12:25 PM and 5/15/2017 12:48 PM *           Implants: * No implants in log *    Specimens:    n/a           Condition: Good    Disposition: PACU - hemodynamically stable.    Attestation: I was present and scrubbed for the entire procedure.

## 2017-05-15 NOTE — IP AVS SNAPSHOT
\[facility]\""  [address]  [place] [address]  Phone: [phone]           Patient Discharge Instructions   Our goal is to set you up for success. This packet includes information on your condition, medications, and your home care.  It will help you care for yourself to prevent having to return to the hospital.     Please ask your nurse if you have any questions.      There are many details to remember when preparing to leave the hospital. Here is what you will need to do:    1. Take your medicine. If you are prescribed medications, review your Medication List on the following pages. You may have new medications to  at the pharmacy and others that you'll need to stop taking. Review the instructions for how and when to take your medications. Talk with your doctor or nurses if you are unsure of what to do.     2. Go to your follow-up appointments. Specific follow-up information is listed in the following pages. Your may be contacted by a nurse or clinical provider about future appointments. Be sure we have all of the phone numbers to reach you. Please contact your provider's office if you are unable to make an appointment.     3. Watch for warning signs. Your doctor or nurse will give you detailed warning signs to watch for and when to call for assistance. These instructions may also include educational information about your condition. If you experience any of warning signs to your health, call your doctor.               ** Verify the list of medication(s) below is accurate and up to date. Carry this with you in case of emergency. If your medications have changed, please notify your healthcare provider.             Medication List      CHANGE how you take these medications        Additional Info                      hydrocodone-acetaminophen 7.5-325mg 7.5-325 mg per tablet   Commonly known as:  NORCO   Quantity:  40 tablet   Refills:  0   Dose:  1 tablet   What changed:  when to take this     Instructions:  Take 1 tablet by mouth every 4 (four) hours as needed for Pain.     Begin Date    AM    Noon    PM    Bedtime            Where to Get Your Medications      These medications were sent to [facility] - [address]  [address], [place] [address]     Phone:  [phone]     hydrocodone-acetaminophen 7.5-325mg 7.5-325 mg per tablet                  Please bring to all follow up appointments:    1. A copy of your discharge instructions.  2. All medicines you are currently taking in their original bottles.  3. Identification and insurance card.    Please arrive 15 minutes ahead of scheduled appointment time.    Please call 24 hours in advance if you must reschedule your appointment and/or time.        Your Scheduled Appointments     May 29, 2017  1:00 PM CDT   Post OP with Kassidy Sharif MD   [place] [facility] ([facility] [place])    [address]  4th Floor [address]  [place] [address] [phone]              Follow-up Information     Follow up with Kassidy Sharif MD In 1 week.    Specialties:  Surgery, General Surgery    Contact information:    [address]  [address]  [place] [id number] [phone]          Discharge Instructions     Future Orders    Activity as tolerated     Diet general     Questions:    Total calories:      Fat restriction, if any:      Protein restriction, if any:      Na restriction, if any:      Fluid restriction:      Additional restrictions:          Discharge Instructions       Ok to remove dressing on 5/16, OK to shower 5/16, use mild soap on incision, pat incision dry. No  swimming until after follow up. Take your pain medication as needed. Take over the counter stool softener while taking pain medication to prevent constipation starting 5/17.  Avoid laxatives.  Ok for light activity (light housework, walking, stair climbing) no strenuous exercise until after follow up. No lifting  "greater than 20 pounds or 1 gallon of milk until after follow up. Please call my office if fever > 101.5, pain uncontrolled with oral medications, persistent nausea/vomiting/or diarrhea, redness or drainage from the incisions, or any other worrisome concerns.       You may experience bleeding per rectum small amounts for up to 10 days. If any concerns please call office.  A dressing was placed into the anal canal, this may pass within the next 48 hours.   A loop was placed into the fistula tract, avoid pulling or wiping in area. If dislodged, please notify MD.   No work for 7 days until cleared by MD.   Start stool softeners on 5/17, avoid laxatives.   Start sitz bath soaks 20 minutes 2-3 times a day and after bowel movements 5/16. Avoid peroxide solutions.   YOu may shower or rinse after bowel movement, avoid wiping.         Primary Diagnosis     Your primary diagnosis was:  Lesion Of Rectum      Admission Information     Date & Time Provider Department CSN    5/15/2017  9:52 AM Kassidy Sharif MD [facility] [id number]      Care Providers     Provider Role Specialty Primary office phone    Kassidy Sharif MD Attending Provider Surgery [phone]    Kassidy Sharif MD Surgeon  Surgery [phone]      Your Vitals Were     BP Pulse Temp Resp Height Weight    113/61 (BP Location: Left arm, Patient Position: Sitting) 66 98.3 °F (36.8 °C) (Oral) 15 5' 5" (1.651 m) 75.7 kg (166 lb 14.2 oz)    SpO2 BMI             98% 27.77 kg/m2         Recent Lab Values     No lab values to display.      Allergies as of 5/15/2017        Reactions    Tylenol [Acetaminophen] Nausea Only      [facility]     [facility] Nurse Care Line - 24/7 Assistance  Unless otherwise directed by your provider, please contact [facility], our nurse care line that is available for 24/7 assistance.     Registered nurses in the [facility] provide clinical advisement, health education, appointment booking, and " other advisory services.  Call for this free service at [phone].        Advance Directives     An advance directive is a document which, in the event you are no longer able to make decisions for yourself, tells your healthcare team what kind of treatment you do or do not want to receive, or who you would like to make those decisions for you.  If you do not currently have an advance directive, [facility] encourages you to create one.  For more information call:  [phone]), [phone] ([phone]),  or log on to [email or web address].        Smoking Cessation     If you would like to quit smoking:   You may be eligible for free services if you are a [place] resident and started smoking cigarettes before September 1, 1988.  Call the Smoking Cessation Trust (SCT) toll free at (589) 876-7274 or (614) 824-5749.   Call 3-193-QUIT-NOW if you do not meet the above criteria.   Contact us via email: [email or web address]   View our website for more information: [email or web address]        Language Assistance Services     ATTENTION: Language assistance services are available, free of charge. Please call [phone].      ATENCIÓN: Si habla español, tiene a baig disposición servicios gratuitos de asistencia lingüística. Llame al [phone].     [facility] Ý: N?u b?n nói Ti?ng Vi?t, có các d?ch v? h? tr? ngôn ng? mi?n phí dành cho b?n. G?i s? [phone].         [facility] complies with applicable Federal civil rights laws and does not discriminate on the basis of race, color, national origin, age, disability, or sex.

## 2017-05-15 NOTE — TRANSFER OF CARE
"Anesthesia Transfer of Care Note    Patient: Amanuel Jimenez    Procedure(s) Performed: Procedure(s) (LRB):  EXAM UNDER ANESTHESIA rectal (N/A)    Patient location: [facility]    Anesthesia Type: MAC    Transport from OR: Transported from OR on room air with adequate spontaneous ventilation    Post pain: adequate analgesia    Post assessment: no apparent anesthetic complications    Post vital signs: stable    Level of consciousness: awake and alert    Nausea/Vomiting: no nausea/vomiting    Complications: none    Transfer of care protocol was followed      Last vitals:   Visit Vitals    /67    Pulse 72    Temp 36.6 °C (97.9 °F) (Oral)    Resp 17    Ht 5' 5" (1.651 m)    Wt 75.7 kg (166 lb 14.2 oz)    SpO2 99%    BMI 27.77 kg/m2     "

## 2017-05-15 NOTE — DISCHARGE INSTRUCTIONS
Ok to remove dressing on 5/16, OK to shower 5/16, use mild soap on incision, pat incision dry. No  swimming until after follow up. Take your pain medication as needed. Take over the counter stool softener while taking pain medication to prevent constipation starting 5/17.  Avoid laxatives.  Ok for light activity (light housework, walking, stair climbing) no strenuous exercise until after follow up. No lifting greater than 20 pounds or 1 gallon of milk until after follow up. Please call my office if fever > 101.5, pain uncontrolled with oral medications, persistent nausea/vomiting/or diarrhea, redness or drainage from the incisions, or any other worrisome concerns.       You may experience bleeding per rectum small amounts for up to 10 days. If any concerns please call office.  A dressing was placed into the anal canal, this may pass within the next 48 hours.   A loop was placed into the fistula tract, avoid pulling or wiping in area. If dislodged, please notify MD.   No work for 7 days until cleared by MD.   Start stool softeners on 5/17, avoid laxatives.   Start sitz bath soaks 20 minutes 2-3 times a day and after bowel movements 5/16. Avoid peroxide solutions.   YOu may shower or rinse after bowel movement, avoid wiping.

## 2017-05-15 NOTE — ANESTHESIA POSTPROCEDURE EVALUATION
"Anesthesia Post Evaluation    Patient: Amanuel Jimenez    Procedure(s) Performed: Procedure(s) (LRB):  EXAM UNDER ANESTHESIA rectal (N/A)  INCISION AND DRAINAGE (I&D), ABSCESS, rectal (N/A)  PLACEMENT-SETON DRAIN (N/A)    Final Anesthesia Type: MAC  Patient location during evaluation: [facility]  Patient participation: Yes- Able to Participate  Level of consciousness: awake and alert  Post-procedure vital signs: reviewed and stable  Pain management: adequate  Airway patency: patent  PONV status at discharge: No PONV  Anesthetic complications: no      Cardiovascular status: hemodynamically stable  Respiratory status: spontaneous ventilation, unassisted and room air  Hydration status: euvolemic  Follow-up not needed.        Visit Vitals    /63 (BP Location: Left arm, Patient Position: Sitting)    Pulse 65    Temp 36.7 °C (98.1 °F) (Oral)    Resp 14    Ht 5' 5" (1.651 m)    Wt 75.7 kg (166 lb 14.2 oz)    SpO2 97%    BMI 27.77 kg/m2       Pain/Karlo Score: Pain Assessment Performed: Yes (5/15/2017 10:57 AM)  Presence of Pain: denies (5/15/2017 10:57 AM)      "

## 2017-05-15 NOTE — PLAN OF CARE
Safety precautions maintained. Call bell in reach. Bed locked and in lowest position. Instructed pt to call for assistance. Pt verbalizes understanding.

## 2017-05-15 NOTE — H&P (VIEW-ONLY)
History & Physical    SUBJECTIVE:     History of Present Illness:  Patient known to Dr. Jeong, seen 5/4 with rectal complaints, concern of abscess spontaneously draining. Today was his last day of Bactrim.     This morning he woke up with increased pain and concern of recurrence.  This has been ongoing since 4/17.  The pain is described as throbbing, and is 4/10 in intensity. The patient is experiencing rectal pain without radiation. Onset was a few days ago. Symptoms have been gradually worsening. Aggravating factors: pressure.  Alleviating factors: none. Associated symptoms: none. The patient denies constipation, diarrhea, dysuria, hematochezia, hematuria, nausea and vomiting.    Last bm today, no issues  No family history of IBD.   + tobacco use                Chief Complaint   Patient presents with    Rectal Pain       Review of patient's allergies indicates:   Allergen Reactions    Tylenol [acetaminophen] Nausea Only       Current Outpatient Prescriptions   Medication Sig Dispense Refill    hydrocodone-acetaminophen 7.5-325mg (NORCO) 7.5-325 mg per tablet Take 1 tablet by mouth every 6 (six) hours as needed for Pain. 20 tablet 0    sulfamethoxazole-trimethoprim 800-160mg (BACTRIM DS) 800-160 mg Tab Take 1 tablet by mouth 2 (two) times daily. 28 tablet 0     No current facility-administered medications for this visit.        History reviewed. No pertinent past medical history.  History reviewed. No pertinent surgical history.  History reviewed. No pertinent family history.  Social History   Substance Use Topics    Smoking status: Current Every Day Smoker     Packs/day: 0.50     Types: Cigarettes    Smokeless tobacco: None    Alcohol use None          Review of Systems   Constitutional: Negative for chills and fever.   HENT: Negative for congestion and sore throat.    Eyes: Negative for photophobia and visual disturbance.   Respiratory: Negative for cough and shortness of breath.    Cardiovascular:  "Negative for chest pain and palpitations.   Gastrointestinal: Positive for rectal pain. Negative for anal bleeding, blood in stool, constipation and diarrhea.   Genitourinary: Negative for dysuria, frequency and hematuria.   Musculoskeletal: Negative for back pain and gait problem.   Skin: Negative for rash and wound.   Neurological: Negative for seizures and headaches.   Hematological: Negative for adenopathy. Does not bruise/bleed easily.   Psychiatric/Behavioral: Negative for sleep disturbance. The patient is not nervous/anxious.        OBJECTIVE:     Vital Signs (Most Recent)  Temp: 98.2 °F (36.8 °C) (05/12/17 0905)  Pulse: 73 (05/12/17 0905)  BP: 124/70 (05/12/17 0905)  5' 5" (1.651 m)  75.7 kg (166 lb 12.5 oz)     Physical Exam   Constitutional: He is oriented to person, place, and time. He appears well-developed and well-nourished. No distress.   HENT:   Head: Normocephalic and atraumatic.   Eyes: Conjunctivae and EOM are normal. No scleral icterus.   Neck: Normal range of motion.   Cardiovascular: Normal rate and intact distal pulses.    Pulmonary/Chest: Effort normal. No stridor. No respiratory distress.   Abdominal: Soft. He exhibits no distension. There is no tenderness.   Genitourinary:   Genitourinary Comments: Left lateral location is nodularity without overlying skin changes and no fluctuance or drainage    KRISSY performed with grade 1 internal hemorrhoids noted, distal anal canal left lateral location is an additional nodularity which is tender and fluctuant   Musculoskeletal: Normal range of motion. He exhibits no edema or tenderness.   Neurological: He is alert and oriented to person, place, and time.   Skin: No rash noted. He is not diaphoretic. No pallor.   Psychiatric: He has a normal mood and affect. His behavior is normal.       Laboratory  n/a    Diagnostic Results:  n/a    ASSESSMENT/PLAN:   26 yo male with rectal abscess, concern of fistula- first presentation. No family history of IBD " however his age and tobacco use and left lateral fistula warrants EUA to appropriately define pathology.     Will stop abx to allow evolution of pathology, currently no evidence of infection.   Risks of surgery discussed including bleeding, infection, pain, scarring, wound complications, injury to local structures, and need for further surgery.  The patient demonstrated understanding of risks and consent signed.   Enema in PReop

## 2017-05-16 ENCOUNTER — TELEPHONE (OUTPATIENT)
Dept: SURGERY | Facility: CLINIC | Age: 27
End: 2017-05-16

## 2017-05-16 ENCOUNTER — TELEPHONE (OUTPATIENT)
Dept: GASTROENTEROLOGY | Facility: CLINIC | Age: 27
End: 2017-05-16

## 2017-05-16 VITALS
SYSTOLIC BLOOD PRESSURE: 115 MMHG | OXYGEN SATURATION: 98 % | WEIGHT: 166.88 LBS | HEIGHT: 65 IN | BODY MASS INDEX: 27.81 KG/M2 | HEART RATE: 66 BPM | RESPIRATION RATE: 15 BRPM | TEMPERATURE: 98 F | DIASTOLIC BLOOD PRESSURE: 66 MMHG

## 2017-05-16 NOTE — TELEPHONE ENCOUNTER
05/16/17 1658  Spoke to pt and offered him to see Dr. Sharif on the same day that he sees Dr. Weathers to avoid coming to the office 2 days in a row. Pt refused, and states he'd like to keep appts the way they are.

## 2017-05-16 NOTE — DISCHARGE SUMMARY
[facility]  Discharge Note  Short Stay    Admit Date: 5/15/2017    Discharge Date and Time: 5/15/2017  3:24 PM     Attending Physician: No att. providers found     Discharge Provider: Kassidy Sharif    Diagnoses:  Active Hospital Problems    Diagnosis  POA    *Rectal lesion [K62.9]  Yes      Resolved Hospital Problems    Diagnosis Date Resolved POA   No resolved problems to display.       Discharged Condition: good    Hospital Course: Patient was admitted for an outpatient procedure and tolerated the procedure well with no complications.    Final Diagnoses: Same as principal problem.    Disposition: Home or Self Care    Follow up/Patient Instructions:    Medications:  Reconciled Home Medications:   Discharge Medication List as of 5/15/2017  1:59 PM      CONTINUE these medications which have CHANGED    Details   hydrocodone-acetaminophen 7.5-325mg (NORCO) 7.5-325 mg per tablet Take 1 tablet by mouth every 4 (four) hours as needed for Pain., Starting 5/15/2017, Until Discontinued, Normal             Discharge Procedure Orders  Diet general     Activity as tolerated       Follow-up Information     Follow up with Kassidy Sharif MD In 1 week.    Specialties:  Surgery, General Surgery    Contact information:    [address]  [address]  [facility] [address] [phone]            Discharge Procedure Orders (must include Diet, Follow-up, Activity):    Discharge Procedure Orders (must include Diet, Follow-up, Activity)  Diet general     Activity as tolerated

## 2017-05-16 NOTE — TELEPHONE ENCOUNTER
----- Message from Kassidy Sharif MD sent at 5/15/2017  1:08 PM CDT -----  New instructions  F/up same    Ok to remove dressing on 5/16, OK to shower 5/16, use mild soap on incision, pat incision dry. No  swimming until after follow up. Take your pain medication as needed. Take over the counter stool softener while taking pain medication to prevent constipation starting 5/17.  Avoid laxatives.  Ok for light activity (light housework, walking, stair climbing) no strenuous exercise until after follow up. No lifting greater than 20 pounds or 1 gallon of milk until after follow up. Please call my office if fever > 101.5, pain uncontrolled with oral medications, persistent nausea/vomiting/or diarrhea, redness or drainage from the incisions, or any other worrisome concerns.       You may experience bleeding per rectum small amounts for up to 10 days. If any concerns please call office.  A dressing was placed into the anal canal, this may pass within the next 48 hours.   A loop was placed into the fistula tract, avoid pulling or wiping in area. If dislodged, please notify MD.   No work for 7 days until cleared by MD.   Start stool softeners on 5/17, avoid laxatives.   Start sitz bath soaks 20 minutes 2-3 times a day and after bowel movements 5/16. Avoid peroxide solutions.   YOu may shower or rinse after bowel movement, avoid wiping.       05/16/17  0848  Attempted to contact the pt to give post op instructions and follow up. No answer. Left voicemail to return call to the office. Will attempt to contact again at a later time.

## 2017-05-16 NOTE — TELEPHONE ENCOUNTER
Spoke with patient to schedule appointment. Patient was informed of date, time, and location. Verbalized understanding.    ----- Message from Galen Weathers MD sent at 5/16/2017  7:48 AM CDT -----  Can I see him in the office  ----- Message -----     From: Kassidy Sharif MD     Sent: 5/15/2017   1:13 PM       To: Galen Weathers MD, Sarahy Aaron Staff    I just EUA this patient with a rectal fistula transphincteric w ulcer, a seton was placed, looks like Crohn's, can you arrange Colonoscopy with biopsy? His family is aware of referral      ty    sm

## 2017-05-23 ENCOUNTER — OFFICE VISIT (OUTPATIENT)
Dept: SURGERY | Facility: CLINIC | Age: 27
End: 2017-05-23
Payer: COMMERCIAL

## 2017-05-23 VITALS
WEIGHT: 166 LBS | SYSTOLIC BLOOD PRESSURE: 109 MMHG | TEMPERATURE: 98 F | BODY MASS INDEX: 27.66 KG/M2 | HEART RATE: 68 BPM | HEIGHT: 65 IN | DIASTOLIC BLOOD PRESSURE: 64 MMHG

## 2017-05-23 DIAGNOSIS — K60.4 RECTAL FISTULA: Primary | ICD-10-CM

## 2017-05-23 PROCEDURE — 99999 PR PBB SHADOW E&M-EST. PATIENT-LVL III: CPT | Mod: PBBFAC,,, | Performed by: SURGERY

## 2017-05-23 PROCEDURE — 99024 POSTOP FOLLOW-UP VISIT: CPT | Mod: S$GLB,,, | Performed by: SURGERY

## 2017-05-23 NOTE — LETTER
May 23, 2017    Amanuel Jimenez  [address] [facility] [address]  [place] [address]         [address]  [address] [facility] [address]  Phone: [phone] May 23, 2017     Patient: Amanuel Jimenez   YOB: 1990   Date of Visit: 5/23/2017       To Whom It May Concern:     Amanuel Jimenez may return to work as of 05/25/17 with no restrictions.       If you have any questions or concerns, please don't hesitate to call.    Sincerely,              MD Brianne Cunningham LPN

## 2017-05-24 ENCOUNTER — OFFICE VISIT (OUTPATIENT)
Dept: GASTROENTEROLOGY | Facility: CLINIC | Age: 27
End: 2017-05-24
Payer: COMMERCIAL

## 2017-05-24 VITALS
DIASTOLIC BLOOD PRESSURE: 67 MMHG | BODY MASS INDEX: 27.66 KG/M2 | SYSTOLIC BLOOD PRESSURE: 112 MMHG | WEIGHT: 166 LBS | HEIGHT: 65 IN

## 2017-05-24 DIAGNOSIS — K62.9 RECTAL LESION: Primary | ICD-10-CM

## 2017-05-24 PROCEDURE — 1160F RVW MEDS BY RX/DR IN RCRD: CPT | Mod: S$GLB,,, | Performed by: INTERNAL MEDICINE

## 2017-05-24 PROCEDURE — 99204 OFFICE O/P NEW MOD 45 MIN: CPT | Mod: S$GLB,,, | Performed by: INTERNAL MEDICINE

## 2017-05-24 PROCEDURE — 99999 PR PBB SHADOW E&M-EST. PATIENT-LVL II: CPT | Mod: PBBFAC,,, | Performed by: INTERNAL MEDICINE

## 2017-05-24 NOTE — LETTER
May 24, 2017      Kassidy Sharif MD  [address]  [address]  [facility] [address]           [facility]  [address]  [address]  [facility] [address]  Phone: [phone]          Patient: Amanuel Jimenez   MR Number: [id number]   YOB: 1990   Date of Visit: 5/24/2017       Dear Dr. Kassidy Sharif:    Thank you for referring Amanuel Jimenez to me for evaluation. Attached you will find relevant portions of my assessment and plan of care.    If you have questions, please do not hesitate to call me. I look forward to following Amanuel Jimenez along with you.    Sincerely,    Galen Weathers MD    Enclosure  CC:  No Recipients    If you would like to receive this communication electronically, please contact [email or web address] or [phone] to request more information on [facility] access.    For providers and/or their staff who would like to refer a patient to [facility], please contact us through our one-stop-shop provider referral line, [facility], at [phone].    If you feel you have received this communication in error or would no longer like to receive these types of communications, please e-mail [email or web address]

## 2017-05-24 NOTE — PROGRESS NOTES
Subjective:       Patient ID: Amanuel Jimenez is a 27 y.o. male.    Chief Complaint: Abdominal Pain     This is a 27-year-old male who is here for evaluation of abdominal complaints.  He noted recurrent rectal discomfort with recurrent abscesses and underwent EUA with suspicion for a fistula.  He does have intermittent constipation but no overt bleeding.  10 pounds of intentional weight loss over the past 1 month, due to dietary changes.  No oral ulcerations, lower extremity rash, blurry vision.  No family history of inflammatory bowel disease.  No other exacerbating or relieving factors or other associated symptoms.    The following portions of the patient's history were reviewed and updated as appropriate: allergies, current medications, past family history, past medical history, past social history, past surgical history and problem list.    (Portions of this note were dictated using voice recognition software and may contain dictation related errors in spelling/grammar/syntax not found on text review)    HPI  Review of Systems   Constitutional: Negative for chills and fever.   HENT: Negative for postnasal drip and trouble swallowing.    Eyes: Negative for pain and visual disturbance.   Respiratory: Negative for cough, choking and shortness of breath.    Cardiovascular: Negative for chest pain and leg swelling.   Gastrointestinal: Positive for anal bleeding and rectal pain. Negative for abdominal distention, abdominal pain, blood in stool, constipation, diarrhea, nausea and vomiting.   Endocrine: Negative for cold intolerance and heat intolerance.   Genitourinary: Negative for difficulty urinating and hematuria.   Neurological: Negative for dizziness and numbness.   Hematological: Negative for adenopathy. Does not bruise/bleed easily.   Psychiatric/Behavioral: Negative for agitation and confusion.       Objective:      Physical Exam   Constitutional: He is oriented to person, place, and time. He appears  well-developed and well-nourished. No distress.   HENT:   Head: Normocephalic.   Eyes: Conjunctivae are normal. No scleral icterus.   Neck: No tracheal deviation present. No thyromegaly present.   Cardiovascular: Normal rate, regular rhythm and normal heart sounds.  Exam reveals no gallop and no friction rub.    No murmur heard.  Pulmonary/Chest: Effort normal and breath sounds normal. He has no wheezes. He has no rales.   Abdominal: Soft. Bowel sounds are normal. He exhibits no distension. There is no tenderness. There is no rebound and no guarding.   Musculoskeletal: Normal range of motion. He exhibits no edema or tenderness.   Neurological: He is alert and oriented to person, place, and time.   Skin: He is not diaphoretic.   Psychiatric: He has a normal mood and affect. His behavior is normal.       Assessment:       1. Rectal lesion        Plan:   1. Colonoscopy, evaluation for crohns

## 2017-05-25 ENCOUNTER — TELEPHONE (OUTPATIENT)
Dept: SURGERY | Facility: CLINIC | Age: 27
End: 2017-05-25

## 2017-05-25 RX ORDER — CIPROFLOXACIN 500 MG/1
500 TABLET ORAL EVERY 12 HOURS
Qty: 20 TABLET | Refills: 0 | Status: SHIPPED | OUTPATIENT
Start: 2017-05-25 | End: 2017-06-04

## 2017-05-25 RX ORDER — METRONIDAZOLE 500 MG/1
500 TABLET ORAL EVERY 8 HOURS
Qty: 30 TABLET | Refills: 0 | Status: SHIPPED | OUTPATIENT
Start: 2017-05-25 | End: 2017-06-04

## 2017-05-25 NOTE — TELEPHONE ENCOUNTER
05/25/17   1634  Contacted pt to inform him that prescription has been sent to the pharmacy. Pt states he is aware, and has retrieved medication from the pharmacy. Date and time of post op appointment with Dr. Sharif confirmed. Pt verbalized understanding.

## 2017-05-26 ENCOUNTER — TELEPHONE (OUTPATIENT)
Dept: SURGERY | Facility: CLINIC | Age: 27
End: 2017-05-26

## 2017-05-26 ENCOUNTER — ANESTHESIA (OUTPATIENT)
Dept: ENDOSCOPY | Facility: HOSPITAL | Age: 27
End: 2017-05-26
Payer: COMMERCIAL

## 2017-05-26 ENCOUNTER — SURGERY (OUTPATIENT)
Age: 27
End: 2017-05-26

## 2017-05-26 ENCOUNTER — ANESTHESIA EVENT (OUTPATIENT)
Dept: ENDOSCOPY | Facility: HOSPITAL | Age: 27
End: 2017-05-26
Payer: COMMERCIAL

## 2017-05-26 ENCOUNTER — HOSPITAL ENCOUNTER (OUTPATIENT)
Facility: HOSPITAL | Age: 27
Discharge: HOME | End: 2017-05-26
Attending: INTERNAL MEDICINE | Admitting: INTERNAL MEDICINE
Payer: COMMERCIAL

## 2017-05-26 VITALS
HEIGHT: 65 IN | WEIGHT: 166 LBS | SYSTOLIC BLOOD PRESSURE: 107 MMHG | OXYGEN SATURATION: 100 % | BODY MASS INDEX: 27.66 KG/M2 | RESPIRATION RATE: 18 BRPM | DIASTOLIC BLOOD PRESSURE: 65 MMHG | TEMPERATURE: 97 F | HEART RATE: 69 BPM

## 2017-05-26 DIAGNOSIS — K62.9 RECTAL LESION: Primary | ICD-10-CM

## 2017-05-26 PROBLEM — K60.4 RECTAL FISTULA: Status: ACTIVE | Noted: 2017-05-12

## 2017-05-26 PROCEDURE — 25000003 PHARM REV CODE 250: Performed by: NURSE ANESTHETIST, CERTIFIED REGISTERED

## 2017-05-26 PROCEDURE — 45378 DIAGNOSTIC COLONOSCOPY: CPT | Mod: ,,, | Performed by: INTERNAL MEDICINE

## 2017-05-26 PROCEDURE — 37000009 HC ANESTHESIA EA ADD 15 MINS: Performed by: INTERNAL MEDICINE

## 2017-05-26 PROCEDURE — 25000003 PHARM REV CODE 250: Performed by: INTERNAL MEDICINE

## 2017-05-26 PROCEDURE — 63600175 PHARM REV CODE 636 W HCPCS: Performed by: NURSE ANESTHETIST, CERTIFIED REGISTERED

## 2017-05-26 PROCEDURE — 37000008 HC ANESTHESIA 1ST 15 MINUTES: Performed by: INTERNAL MEDICINE

## 2017-05-26 PROCEDURE — 45378 DIAGNOSTIC COLONOSCOPY: CPT | Performed by: INTERNAL MEDICINE

## 2017-05-26 RX ORDER — LIDOCAINE HCL/PF 100 MG/5ML
SYRINGE (ML) INTRAVENOUS
Status: DISCONTINUED | OUTPATIENT
Start: 2017-05-26 | End: 2017-05-26

## 2017-05-26 RX ORDER — PROPOFOL 10 MG/ML
VIAL (ML) INTRAVENOUS
Status: DISCONTINUED | OUTPATIENT
Start: 2017-05-26 | End: 2017-05-26

## 2017-05-26 RX ORDER — SODIUM CHLORIDE 9 MG/ML
INJECTION, SOLUTION INTRAVENOUS CONTINUOUS
Status: DISCONTINUED | OUTPATIENT
Start: 2017-05-26 | End: 2017-05-26 | Stop reason: HOSPADM

## 2017-05-26 RX ORDER — PROPOFOL 10 MG/ML
VIAL (ML) INTRAVENOUS CONTINUOUS PRN
Status: DISCONTINUED | OUTPATIENT
Start: 2017-05-26 | End: 2017-05-26

## 2017-05-26 RX ADMIN — PROPOFOL 25 MG: 10 INJECTION, EMULSION INTRAVENOUS at 11:05

## 2017-05-26 RX ADMIN — SODIUM CHLORIDE: 0.9 INJECTION, SOLUTION INTRAVENOUS at 11:05

## 2017-05-26 RX ADMIN — LIDOCAINE HYDROCHLORIDE 50 MG: 20 INJECTION, SOLUTION INTRAVENOUS at 11:05

## 2017-05-26 RX ADMIN — PROPOFOL 200 MCG/KG/MIN: 10 INJECTION, EMULSION INTRAVENOUS at 11:05

## 2017-05-26 NOTE — TRANSFER OF CARE
"Anesthesia Transfer of Care Note    Patient: Amanuel Jimenez    Procedure(s) Performed: Procedure(s) (LRB):  COLONOSCOPY (N/A)    Patient location: GI    Anesthesia Type: MAC    Transport from OR: Transported from OR on room air with adequate spontaneous ventilation    Post pain: adequate analgesia    Post assessment: no apparent anesthetic complications    Post vital signs: stable    Level of consciousness: responds to stimulation and sedated    Nausea/Vomiting: no nausea/vomiting    Complications: none    Transfer of care protocol was followed      Last vitals:   Visit Vitals  BP (!) 118/56 (Patient Position: Lying)   Pulse 62   Temp 36.9 °C (98.4 °F) (Oral)   Resp 18   Ht 5' 5" (1.651 m)   Wt 75.3 kg (166 lb)   SpO2 98%   BMI 27.62 kg/m²     "

## 2017-05-26 NOTE — TELEPHONE ENCOUNTER
05/26/17   1626  Attempted to contact the pt to reschedule post op appt. No answer. Left vm to reschedule via the call center or the pt portal. Will attempt to contact again at a later time.

## 2017-05-26 NOTE — PROGRESS NOTES
"HPI:  The patient is status post eua seton placement in transphincteric fistula  Last day of abx today  Pain minimal  bm without blood         /64   Pulse 68   Temp 97.8 °F (36.6 °C)   Ht 5' 5" (1.651 m)   Wt 75.3 kg (166 lb)   BMI 27.62 kg/m²       Physical Exam    Fistula clean with seton in place      ASSESSMENT:  The patient is doing well after surgery.             PLAN:  Follow up 2 weeks  Gi for scope to rule out IBD  Activity: can resume prior level of activity  Local wound care discussed    Continue bowel regimen      "

## 2017-05-26 NOTE — ANESTHESIA PREPROCEDURE EVALUATION
05/26/2017  Amanuel Jimenez is a 27 y.o., male for colonoscopy under MAC    Review of patient's allergies indicates:   Allergen Reactions    Tylenol [acetaminophen] Nausea Only     No past medical history on file.  No past surgical history on file.    Patient Active Problem List   Diagnosis    Rectal lesion     Patient Active Problem List   Diagnosis    Rectal lesion       Pre-op Assessment    I have reviewed the Patient Summary Reports.      I have reviewed the Medications.     Review of Systems  Anesthesia Hx:   Denies Personal Hx of Anesthesia complications.   Cardiovascular:   Exercise tolerance: good      Wt Readings from Last 3 Encounters:   05/24/17 75.3 kg (166 lb)   05/23/17 75.3 kg (166 lb)   05/15/17 75.7 kg (166 lb 14.2 oz)     Temp Readings from Last 3 Encounters:   05/23/17 36.6 °C (97.8 °F)   05/15/17 36.7 °C (98.1 °F) (Oral)   05/12/17 36.8 °C (98.2 °F) (Oral)     BP Readings from Last 3 Encounters:   05/24/17 112/67   05/23/17 109/64   05/15/17 115/66     Pulse Readings from Last 3 Encounters:   05/23/17 68   05/15/17 66   05/12/17 73     Lab Results   Component Value Date    WBC 9.46 04/18/2017    HGB 15.3 04/18/2017    HCT 45.3 04/18/2017    MCV 87 04/18/2017     04/18/2017       Chemistry        Component Value Date/Time     04/18/2017 0757    K 4.4 04/18/2017 0757     04/18/2017 0757    CO2 22 (L) 04/18/2017 0757    BUN 11 04/18/2017 0757    CREATININE 1.0 04/18/2017 0757    GLU 98 04/18/2017 0757        Component Value Date/Time    CALCIUM 9.2 04/18/2017 0757    ALKPHOS 81 04/18/2017 0757    AST 24 04/18/2017 0757    ALT 42 04/18/2017 0757    BILITOT 0.4 04/18/2017 0757            Physical Exam  General:  Well nourished            Mental Status:  Mental Status Findings:         Anesthesia Plan  Type of Anesthesia, risks & benefits discussed:  Anesthesia Type:   MAC  Patient's Preference:   Intra-op Monitoring Plan:   Intra-op Monitoring Plan Comments:   Post Op Pain Control Plan:   Post Op Pain Control Plan Comments:   Induction:   IV  Beta Blocker:         Informed Consent: Patient understands risks and agrees with Anesthesia plan.  Questions answered. Anesthesia consent signed with patient.  ASA Score: 1     Day of Surgery Review of History & Physical:    H&P update referred to the surgeon.         Ready For Surgery From Anesthesia Perspective.

## 2017-05-26 NOTE — INTERVAL H&P NOTE
The patient has been examined and the H&P has been reviewed:    I concur with the findings and no changes have occurred since H&P was written.    Anesthesia/Surgery risks, benefits and alternative options discussed and understood by patient/family.          Active Hospital Problems    Diagnosis  POA    Rectal lesion [K62.9]  Yes      Resolved Hospital Problems    Diagnosis Date Resolved POA   No resolved problems to display.

## 2017-05-26 NOTE — ANESTHESIA POSTPROCEDURE EVALUATION
"Anesthesia Post Evaluation    Patient: Amanuel Jimenez    Procedure(s) Performed: Procedure(s) (LRB):  COLONOSCOPY (N/A)    Final Anesthesia Type: MAC  Patient location during evaluation: GI PACU  Patient participation: Yes- Able to Participate  Level of consciousness: awake and alert  Post-procedure vital signs: reviewed and stable  Pain management: adequate  Airway patency: patent  PONV status at discharge: No PONV  Anesthetic complications: no      Cardiovascular status: hemodynamically stable  Respiratory status: unassisted, spontaneous ventilation and room air  Hydration status: euvolemic  Follow-up not needed.        Visit Vitals  BP (!) 118/56 (Patient Position: Lying)   Pulse 62   Temp 36.9 °C (98.4 °F) (Oral)   Resp 18   Ht 5' 5" (1.651 m)   Wt 75.3 kg (166 lb)   SpO2 98%   BMI 27.62 kg/m²       Pain/Karlo Score: Pain Assessment Performed: Yes (5/26/2017 10:55 AM)  Presence of Pain: denies (5/26/2017 10:55 AM)      "

## 2017-05-29 ENCOUNTER — TELEPHONE (OUTPATIENT)
Dept: SURGERY | Facility: CLINIC | Age: 27
End: 2017-05-29

## 2017-05-29 NOTE — TELEPHONE ENCOUNTER
----- Message from Kassidy Sharif MD sent at 5/26/2017  2:08 PM CDT -----  pls make sure appt in 2 weeks  ty    05/29/17    0823  Spoke to pt appt rescheduled to 06/09/17 at 2:40pm. Date and time of appt confirmed. Pt verbalized understanding.

## 2017-05-30 ENCOUNTER — TELEPHONE (OUTPATIENT)
Dept: ENDOSCOPY | Facility: HOSPITAL | Age: 27
End: 2017-05-30

## 2017-06-09 ENCOUNTER — OFFICE VISIT (OUTPATIENT)
Dept: SURGERY | Facility: CLINIC | Age: 27
End: 2017-06-09
Payer: COMMERCIAL

## 2017-06-09 VITALS
SYSTOLIC BLOOD PRESSURE: 113 MMHG | TEMPERATURE: 98 F | DIASTOLIC BLOOD PRESSURE: 65 MMHG | HEART RATE: 75 BPM | HEIGHT: 65 IN

## 2017-06-09 DIAGNOSIS — K60.4 RECTAL FISTULA: Primary | ICD-10-CM

## 2017-06-09 PROCEDURE — 99024 POSTOP FOLLOW-UP VISIT: CPT | Mod: S$GLB,,, | Performed by: SURGERY

## 2017-06-09 PROCEDURE — 99999 PR PBB SHADOW E&M-EST. PATIENT-LVL III: CPT | Mod: PBBFAC,,, | Performed by: SURGERY

## 2017-06-13 NOTE — PROGRESS NOTES
"HPI:  The patient is status post seton placement for transphincteric fistula  S/p colonoscopy to rule out crohn's no evid of IBD  Here for seton tightening         /65 (BP Location: Left arm, Patient Position: Sitting)   Pulse 75   Temp 98.1 °F (36.7 °C) (Oral)   Ht 5' 5" (1.651 m)       Physical Exam  C/d/i seton in place      ASSESSMENT:  The patient is doing well after surgery.             PLAN:  Seton tightened today  Activity: light duty for 2 weeks, then can resume prior level of activity  Pain control: has vicodin script  Continue bowel regimen  Local wound care discussed  rtc 2 weeks  "

## 2017-06-23 ENCOUNTER — OFFICE VISIT (OUTPATIENT)
Dept: SURGERY | Facility: CLINIC | Age: 27
End: 2017-06-23
Payer: COMMERCIAL

## 2017-06-23 VITALS
TEMPERATURE: 98 F | SYSTOLIC BLOOD PRESSURE: 94 MMHG | HEIGHT: 65 IN | DIASTOLIC BLOOD PRESSURE: 49 MMHG | HEART RATE: 68 BPM

## 2017-06-23 DIAGNOSIS — K60.4 RECTAL FISTULA: Primary | ICD-10-CM

## 2017-06-23 PROCEDURE — 99212 OFFICE O/P EST SF 10 MIN: CPT | Mod: S$GLB,,, | Performed by: SURGERY

## 2017-06-23 PROCEDURE — 99999 PR PBB SHADOW E&M-EST. PATIENT-LVL III: CPT | Mod: PBBFAC,,, | Performed by: SURGERY

## 2017-06-26 ENCOUNTER — TELEPHONE (OUTPATIENT)
Dept: SURGERY | Facility: HOSPITAL | Age: 27
End: 2017-06-26

## 2017-06-26 RX ORDER — HYDROCODONE BITARTRATE AND ACETAMINOPHEN 7.5; 325 MG/1; MG/1
1 TABLET ORAL EVERY 4 HOURS PRN
Qty: 40 TABLET | Refills: 0 | Status: SHIPPED | OUTPATIENT
Start: 2017-06-26 | End: 2017-07-03 | Stop reason: SDUPTHER

## 2017-06-26 NOTE — TELEPHONE ENCOUNTER
----- Message from Brianne Ward LPN sent at 6/26/2017 10:02 AM CDT -----  Contact: Self [phone]      ----- Message -----  From: Saundra Loo  Sent: 6/26/2017   9:35 AM  To: Kole Yi Staff    Patient is calling to get refills on his pain medication sent to [facility] [address] [address] [address] [address]  AT [facility] [address] [phone] (Phone)  [phone] (Fax)

## 2017-06-26 NOTE — TELEPHONE ENCOUNTER
06/26/17   1149  Contacted pt to inform him that his prescription was at the pharmacy. Pt verbalized understanding.

## 2017-06-27 NOTE — PROGRESS NOTES
"HPI:  The patient is status post seton placement for transphincteric fistula  S/p colonoscopy to rule out crohn's no evid of IBD  Here for seton tightening         BP (!) 94/49 (BP Location: Left arm, Patient Position: Sitting)   Pulse 68   Temp 98 °F (36.7 °C) (Oral)   Ht 5' 5" (1.651 m)       Physical Exam  C/d/i seton in place      ASSESSMENT:  The patient is doing well after surgery.             PLAN:  Seton tightened today  Activity: light duty for 2 weeks, then can resume prior level of activity  Pain control: has vicodin script  Continue bowel regimen  Local wound care discussed  rtc 2 weeks  "

## 2017-06-29 ENCOUNTER — TELEPHONE (OUTPATIENT)
Dept: SURGERY | Facility: CLINIC | Age: 27
End: 2017-06-29

## 2017-06-29 NOTE — TELEPHONE ENCOUNTER
----- Message from Soumya Causey sent at 6/29/2017 12:54 PM CDT -----  Contact: [phone]  Patient would like to speak with you regarding his procedure    06/29/17     1322  Pt was calling to double check on appointment reschedule. Appt confirmed for 07/03/17 at 9am. Pt states he's been having pretty bad pain since last seton tightening, but states that taking the pain medication has helped to alleviate some of the pain. Pt states he was mainly just calling to check in with us. Pt encouraged to call tomorrow if he feels he needs to be seen sooner that Monday. Pt verbalized understanding.

## 2017-07-03 ENCOUNTER — OFFICE VISIT (OUTPATIENT)
Dept: SURGERY | Facility: CLINIC | Age: 27
End: 2017-07-03
Payer: COMMERCIAL

## 2017-07-03 VITALS
SYSTOLIC BLOOD PRESSURE: 117 MMHG | DIASTOLIC BLOOD PRESSURE: 67 MMHG | TEMPERATURE: 98 F | HEART RATE: 63 BPM | HEIGHT: 65 IN | BODY MASS INDEX: 27.25 KG/M2 | WEIGHT: 163.56 LBS

## 2017-07-03 DIAGNOSIS — K60.4 RECTAL FISTULA: Primary | ICD-10-CM

## 2017-07-03 PROCEDURE — 99212 OFFICE O/P EST SF 10 MIN: CPT | Mod: S$GLB,,, | Performed by: SURGERY

## 2017-07-03 PROCEDURE — 99999 PR PBB SHADOW E&M-EST. PATIENT-LVL III: CPT | Mod: PBBFAC,,, | Performed by: SURGERY

## 2017-07-03 RX ORDER — HYDROCODONE BITARTRATE AND ACETAMINOPHEN 7.5; 325 MG/1; MG/1
1 TABLET ORAL EVERY 4 HOURS PRN
Qty: 40 TABLET | Refills: 0 | Status: SHIPPED | OUTPATIENT
Start: 2017-07-03 | End: 2017-08-03 | Stop reason: SDUPTHER

## 2017-07-03 NOTE — PROGRESS NOTES
"HPI:  The patient is status post seton placement for transphincteric fistula  S/p colonoscopy to rule out crohn's no evid of IBD  Significant pain following last seton tightening, unable to work due to pain.          /67 (BP Location: Left arm, Patient Position: Sitting)   Pulse 63   Temp 97.7 °F (36.5 °C) (Oral)   Ht 5' 5" (1.651 m)   Wt 74.2 kg (163 lb 9.3 oz)   BMI 27.22 kg/m²       Physical Exam  C/d/i seton in place      ASSESSMENT:  The patient is doing well after surgery.         PLAN:  Plan to tighten seton during next visit in one week  Activity: Patient feels unable to return to his job due to physical demands until seton tightening is completed  Pain control: has vicodin script  Continue bowel regimen  Local wound care discussed  rtc 1 weeks  "

## 2017-07-10 ENCOUNTER — OFFICE VISIT (OUTPATIENT)
Dept: SURGERY | Facility: CLINIC | Age: 27
End: 2017-07-10
Payer: COMMERCIAL

## 2017-07-10 VITALS
SYSTOLIC BLOOD PRESSURE: 122 MMHG | DIASTOLIC BLOOD PRESSURE: 70 MMHG | TEMPERATURE: 99 F | HEART RATE: 82 BPM | BODY MASS INDEX: 27.56 KG/M2 | WEIGHT: 165.44 LBS | HEIGHT: 65 IN

## 2017-07-10 DIAGNOSIS — K60.4 RECTAL FISTULA: Primary | ICD-10-CM

## 2017-07-10 PROCEDURE — 99999 PR PBB SHADOW E&M-EST. PATIENT-LVL III: CPT | Mod: PBBFAC,,, | Performed by: SURGERY

## 2017-07-10 PROCEDURE — 99213 OFFICE O/P EST LOW 20 MIN: CPT | Mod: S$GLB,,, | Performed by: SURGERY

## 2017-07-10 NOTE — PROGRESS NOTES
"HPI:  The patient is status post seton placement for transphincteric fistula  S/p colonoscopy to rule out crohn's no evid of IBD  Pain from last seton tightening has improved.  Continues to perform sitz baths         /70 (BP Location: Left arm, Patient Position: Sitting)   Pulse 82   Temp 98.6 °F (37 °C) (Oral)   Ht 5' 5" (1.651 m)   Wt 75 kg (165 lb 7.3 oz)   BMI 27.53 kg/m²       Physical Exam   Constitutional: He is oriented to person, place, and time and well-developed, well-nourished, and in no distress. No distress.   Pulmonary/Chest: Effort normal.   Neurological: He is alert and oriented to person, place, and time.   Psychiatric: Affect normal.     C/d/i seton in place      ASSESSMENT:  The patient is doing well after surgery.         PLAN:  Seton tightened today  Activity: Patient feels unable to return to his job due to physical demands until seton tightening is completed  Pain control: has vicodin script  Continue bowel regimen  Local wound care discussed  rtc 1 weeks  "

## 2017-07-12 ENCOUNTER — PATIENT MESSAGE (OUTPATIENT)
Dept: SURGERY | Facility: CLINIC | Age: 27
End: 2017-07-12

## 2017-07-13 ENCOUNTER — TELEPHONE (OUTPATIENT)
Dept: SURGERY | Facility: CLINIC | Age: 27
End: 2017-07-13

## 2017-07-13 NOTE — TELEPHONE ENCOUNTER
----- Message from Iram Hermosillo sent at 7/13/2017  7:28 AM CDT -----  No. [phone]    Is the paperwork ready.  Please call patient.      07/13/17      0494  Patient contacted to inform him that paperwork has been completed and faxed. Pt states there is more paperwork to be completed, and asks approximately when the seton will be removed, and will he will be able to return to work. Pt informed that Dr. Sharif is currently in the operating room, but that I would forward the message to her, and once the dates are received a letter will be faxed to the number provided. Pt verbalized understanding.

## 2017-07-13 NOTE — TELEPHONE ENCOUNTER
----- Message from Nima Fleming sent at 7/13/2017  1:37 PM CDT -----  Contact: Karis/mother   [phone]  Pt mother states the forms you faxed or filled out incorrectly.  Please advise     07/13/17    1351  Spoke to pt's mother and explained what the disability form was asking. Pt's mother verbalized understanding.

## 2017-07-19 ENCOUNTER — TELEPHONE (OUTPATIENT)
Dept: SURGERY | Facility: CLINIC | Age: 27
End: 2017-07-19

## 2017-07-19 NOTE — TELEPHONE ENCOUNTER
"07/19/2017      1635  Contacted pt regarding rescheduling his appt on 07/24/2017 @ 1300. Informed pt that we could reschedule him for either 07/21/2017 or 07/28/2017. Pt stated "he could not make either one of those days due to the fact that he had plans." Pt was then offered 07/25/2017 @ 1040. He accepted the appt. The appt was confirmed for 07/25/2017 @ 1040. Pt verbalized understanding.  "

## 2017-07-25 ENCOUNTER — OFFICE VISIT (OUTPATIENT)
Dept: SURGERY | Facility: CLINIC | Age: 27
End: 2017-07-25
Payer: COMMERCIAL

## 2017-07-25 VITALS
WEIGHT: 162.94 LBS | SYSTOLIC BLOOD PRESSURE: 118 MMHG | DIASTOLIC BLOOD PRESSURE: 77 MMHG | TEMPERATURE: 98 F | BODY MASS INDEX: 27.15 KG/M2 | HEART RATE: 67 BPM | HEIGHT: 65 IN

## 2017-07-25 DIAGNOSIS — K60.4 RECTAL FISTULA: Primary | ICD-10-CM

## 2017-07-25 PROCEDURE — 99999 PR PBB SHADOW E&M-EST. PATIENT-LVL III: CPT | Mod: PBBFAC,,, | Performed by: SURGERY

## 2017-07-25 PROCEDURE — 99213 OFFICE O/P EST LOW 20 MIN: CPT | Mod: S$GLB,,, | Performed by: SURGERY

## 2017-07-25 NOTE — PROGRESS NOTES
"HPI:  The patient is status post seton placement for transphincteric fistula  S/p colonoscopy to rule out crohn's no evid of IBD  Pain from last seton tightening has improved.  Continues to perform sitz baths      /77 (BP Location: Left arm, Patient Position: Sitting)   Pulse 67   Temp 98.1 °F (36.7 °C) (Oral)   Ht 5' 5" (1.651 m)   Wt 73.9 kg (162 lb 14.7 oz)   BMI 27.11 kg/m²       Physical Exam   Constitutional: He is oriented to person, place, and time and well-developed, well-nourished, and in no distress. No distress.   Pulmonary/Chest: Effort normal.   Neurological: He is alert and oriented to person, place, and time.   Psychiatric: Affect normal.     C/d/i seton in place      ASSESSMENT:  The patient is doing well after surgery.         PLAN:  Seton tightened today  Activity: Patient feels unable to return to his job due to physical demands until seton tightening is completed  Pain control: has vicodin script  Continue bowel regimen  Local wound care discussed  rtc 2 weeks  "

## 2017-08-03 ENCOUNTER — TELEPHONE (OUTPATIENT)
Dept: SURGERY | Facility: HOSPITAL | Age: 27
End: 2017-08-03

## 2017-08-03 RX ORDER — HYDROCODONE BITARTRATE AND ACETAMINOPHEN 7.5; 325 MG/1; MG/1
1 TABLET ORAL EVERY 4 HOURS PRN
Qty: 40 TABLET | Refills: 0 | Status: SHIPPED | OUTPATIENT
Start: 2017-08-03 | End: 2017-09-01

## 2017-08-03 NOTE — TELEPHONE ENCOUNTER
----- Message from Minh Santos MA sent at 8/3/2017 11:40 AM CDT -----      ----- Message -----  From: Iram Hermosillo  Sent: 8/3/2017  11:00 AM  To: [facility]i Staff    No. [phone]    Patient needs script for [facility] on [address].

## 2017-08-03 NOTE — TELEPHONE ENCOUNTER
----- Message from Iram Hermosillo sent at 8/3/2017 11:00 AM CDT -----  No. [phone]    Patient needs script for [facility] on [address] and [address].

## 2017-08-03 NOTE — TELEPHONE ENCOUNTER
08/03/2017     1453  Contacted pt to inform him that the Rx for his pain medication has been sent to the pharmacy. No answer. Left message on voicemail.

## 2017-08-08 ENCOUNTER — OFFICE VISIT (OUTPATIENT)
Dept: SURGERY | Facility: CLINIC | Age: 27
End: 2017-08-08
Payer: COMMERCIAL

## 2017-08-08 VITALS
SYSTOLIC BLOOD PRESSURE: 134 MMHG | DIASTOLIC BLOOD PRESSURE: 75 MMHG | HEIGHT: 65 IN | HEART RATE: 80 BPM | WEIGHT: 163.25 LBS | BODY MASS INDEX: 27.2 KG/M2 | TEMPERATURE: 99 F

## 2017-08-08 DIAGNOSIS — K60.4 RECTAL FISTULA: Primary | ICD-10-CM

## 2017-08-08 PROCEDURE — 99213 OFFICE O/P EST LOW 20 MIN: CPT | Mod: S$GLB,,, | Performed by: SURGERY

## 2017-08-08 PROCEDURE — 3008F BODY MASS INDEX DOCD: CPT | Mod: S$GLB,,, | Performed by: SURGERY

## 2017-08-08 PROCEDURE — 99999 PR PBB SHADOW E&M-EST. PATIENT-LVL III: CPT | Mod: PBBFAC,,, | Performed by: SURGERY

## 2017-08-08 RX ORDER — METRONIDAZOLE 500 MG/1
500 TABLET ORAL EVERY 8 HOURS
Qty: 21 TABLET | Refills: 0 | Status: SHIPPED | OUTPATIENT
Start: 2017-08-08 | End: 2017-08-15

## 2017-08-08 RX ORDER — CIPROFLOXACIN 500 MG/1
500 TABLET ORAL EVERY 12 HOURS
Qty: 14 TABLET | Refills: 0 | Status: SHIPPED | OUTPATIENT
Start: 2017-08-08 | End: 2017-08-15

## 2017-08-08 NOTE — PROGRESS NOTES
"HPI:  The patient is status post seton placement for transphincteric fistula  S/p colonoscopy to rule out crohn's no evid of IBD  Has relaxed on sitz bath frequency      /75 (BP Location: Right arm, Patient Position: Sitting)   Pulse 80   Temp 98.7 °F (37.1 °C) (Oral)   Ht 5' 5" (1.651 m)   Wt 74 kg (163 lb 4 oz)   BMI 27.17 kg/m²       Physical Exam   Constitutional: He is oriented to person, place, and time and well-developed, well-nourished, and in no distress. No distress.   Pulmonary/Chest: Effort normal.   Neurological: He is alert and oriented to person, place, and time.   Psychiatric: Affect normal.     C/d/i seton in place  There is nearby purulence and abscess tenderness very minimal    ASSESSMENT:  The patient is doing well after surgery.         PLAN:  Seton remains in place no tightening today  Will give 1 week cipro flagyl  rtc 1 week  Activity: Patient feels unable to return to his job due to physical demands until seton tightening is completed  Pain control: has vicodin script  Continue bowel regimen  Local wound care discussed     "

## 2017-08-15 ENCOUNTER — OFFICE VISIT (OUTPATIENT)
Dept: SURGERY | Facility: CLINIC | Age: 27
End: 2017-08-15
Payer: COMMERCIAL

## 2017-08-15 VITALS
DIASTOLIC BLOOD PRESSURE: 77 MMHG | BODY MASS INDEX: 27.05 KG/M2 | WEIGHT: 162.38 LBS | HEIGHT: 65 IN | HEART RATE: 63 BPM | SYSTOLIC BLOOD PRESSURE: 120 MMHG | TEMPERATURE: 98 F

## 2017-08-15 DIAGNOSIS — K60.4 RECTAL FISTULA: Primary | ICD-10-CM

## 2017-08-15 PROCEDURE — 3008F BODY MASS INDEX DOCD: CPT | Mod: S$GLB,,, | Performed by: SURGERY

## 2017-08-15 PROCEDURE — 99999 PR PBB SHADOW E&M-EST. PATIENT-LVL III: CPT | Mod: PBBFAC,,, | Performed by: SURGERY

## 2017-08-15 PROCEDURE — 99213 OFFICE O/P EST LOW 20 MIN: CPT | Mod: S$GLB,,, | Performed by: SURGERY

## 2017-08-17 NOTE — PROGRESS NOTES
"HPI:  The patient is status post seton placement for transphincteric fistula  S/p colonoscopy to rule out crohn's no evid of IBD  Resumed local wound care, on abx with interval improvement in drainage      /77 (BP Location: Left arm, Patient Position: Sitting)   Pulse 63   Temp 97.7 °F (36.5 °C) (Oral)   Ht 5' 5" (1.651 m)   Wt 73.6 kg (162 lb 5.9 oz)   BMI 27.02 kg/m²       Physical Exam   Constitutional: He is oriented to person, place, and time and well-developed, well-nourished, and in no distress. No distress.   Pulmonary/Chest: Effort normal.   Neurological: He is alert and oriented to person, place, and time.   Psychiatric: Affect normal.     C/d/i seton in place       ASSESSMENT:  The patient is doing well after surgery.         PLAN:  Seton remains in place, tightened  rtc 2 weeks  Ok to resume work at his own pace  Local wound care discussed     "

## 2017-08-21 ENCOUNTER — TELEPHONE (OUTPATIENT)
Dept: SURGERY | Facility: CLINIC | Age: 27
End: 2017-08-21

## 2017-08-21 NOTE — TELEPHONE ENCOUNTER
"----- Message from Saundra Loo sent at 8/21/2017 11:44 AM CDT -----  Contact: Self [phone]  Patient Returning Your Phone Call    08/21/2017    1524  Contacted pt regarding the above message. Pt stated that he needs his return to work letter to say "full duty". Informed pt that this information would be given to the doctor and someone will contact him.  "

## 2017-08-21 NOTE — TELEPHONE ENCOUNTER
----- Message from Nima Fleming sent at 8/21/2017 11:18 AM CDT -----  Contact: [phone]/self  Pt requesting to speak with the nurse concerning his return to work excuse.  Please call and advise         08/21/2017    1143  Contacted pt regarding the above message. No answer. Left voicemail.

## 2017-08-30 ENCOUNTER — TELEPHONE (OUTPATIENT)
Dept: SURGERY | Facility: CLINIC | Age: 27
End: 2017-08-30

## 2017-08-30 NOTE — TELEPHONE ENCOUNTER
----- Message from Cheri Arias sent at 8/30/2017 10:09 AM CDT -----  Contact: Self/ [phone]  Patient would like to speak with you about his appointment he thinks there is a change in date. Please advise.       08/30/2017     1019  Contacted pt regarding the above message. Pt was confirming that his appt was still scheduled for 09/01/2017. appt date and time was confirmed. Pt verbalized understanding.

## 2017-09-01 ENCOUNTER — OFFICE VISIT (OUTPATIENT)
Dept: SURGERY | Facility: CLINIC | Age: 27
End: 2017-09-01
Payer: COMMERCIAL

## 2017-09-01 VITALS
DIASTOLIC BLOOD PRESSURE: 73 MMHG | TEMPERATURE: 98 F | SYSTOLIC BLOOD PRESSURE: 117 MMHG | WEIGHT: 161.38 LBS | BODY MASS INDEX: 26.89 KG/M2 | HEIGHT: 65 IN | HEART RATE: 76 BPM

## 2017-09-01 DIAGNOSIS — K60.4 RECTAL FISTULA: Primary | ICD-10-CM

## 2017-09-01 PROCEDURE — 99999 PR PBB SHADOW E&M-EST. PATIENT-LVL III: CPT | Mod: PBBFAC,,, | Performed by: SURGERY

## 2017-09-01 PROCEDURE — 99024 POSTOP FOLLOW-UP VISIT: CPT | Mod: S$GLB,,, | Performed by: SURGERY

## 2017-09-01 NOTE — PROGRESS NOTES
"HPI:  The patient is status post seton placement for transphincteric fistula  S/p colonoscopy to rule out crohn's no evid of IBD  Resumed local wound care.  States approx 1 week ago noticed seton was gone. No pain, no drainage      /73 (BP Location: Left arm, Patient Position: Sitting)   Pulse 76   Temp 97.9 °F (36.6 °C) (Oral)   Ht 5' 5" (1.651 m)   Wt 73.2 kg (161 lb 6 oz)   BMI 26.85 kg/m²       Physical Exam   Constitutional: He is oriented to person, place, and time and well-developed, well-nourished, and in no distress. No distress.   Pulmonary/Chest: Effort normal.   Neurological: He is alert and oriented to person, place, and time.   Psychiatric: Affect normal.   external anus with evidence of prior surgery with no evidence of abscess, no drainage       ASSESSMENT:  The patient is doing well after surgery.         PLAN:  Seton out on its own  rtc 3 months  Ok to swim  Cont sitz baths, cleansing     "

## 2017-11-08 ENCOUNTER — OFFICE VISIT (OUTPATIENT)
Dept: INTERNAL MEDICINE | Facility: CLINIC | Age: 27
End: 2017-11-08
Payer: COMMERCIAL

## 2017-11-08 VITALS
HEIGHT: 65 IN | HEART RATE: 64 BPM | SYSTOLIC BLOOD PRESSURE: 113 MMHG | DIASTOLIC BLOOD PRESSURE: 66 MMHG | BODY MASS INDEX: 27.25 KG/M2 | WEIGHT: 163.56 LBS | TEMPERATURE: 98 F

## 2017-11-08 DIAGNOSIS — J20.8 ACUTE BACTERIAL BRONCHITIS: Primary | ICD-10-CM

## 2017-11-08 DIAGNOSIS — B96.89 ACUTE BACTERIAL BRONCHITIS: Primary | ICD-10-CM

## 2017-11-08 PROCEDURE — 99213 OFFICE O/P EST LOW 20 MIN: CPT | Mod: S$GLB,,, | Performed by: FAMILY MEDICINE

## 2017-11-08 PROCEDURE — 99999 PR PBB SHADOW E&M-EST. PATIENT-LVL III: CPT | Mod: PBBFAC,,, | Performed by: FAMILY MEDICINE

## 2017-11-08 RX ORDER — AZITHROMYCIN 250 MG/1
TABLET, FILM COATED ORAL
Qty: 6 TABLET | Refills: 0 | Status: SHIPPED | OUTPATIENT
Start: 2017-11-08 | End: 2018-04-19 | Stop reason: ALTCHOICE

## 2017-11-08 RX ORDER — ALBUTEROL SULFATE 90 UG/1
2 AEROSOL, METERED RESPIRATORY (INHALATION) EVERY 6 HOURS PRN
Qty: 18 G | Refills: 0 | Status: SHIPPED | OUTPATIENT
Start: 2017-11-08 | End: 2018-09-24

## 2017-11-08 RX ORDER — METHYLPREDNISOLONE 4 MG/1
TABLET ORAL
Qty: 1 PACKAGE | Refills: 0 | Status: SHIPPED | OUTPATIENT
Start: 2017-11-08 | End: 2017-11-29

## 2017-11-08 RX ORDER — PROMETHAZINE HYDROCHLORIDE AND CODEINE PHOSPHATE 6.25; 1 MG/5ML; MG/5ML
5 SOLUTION ORAL EVERY 4 HOURS PRN
Qty: 118 ML | Refills: 0 | Status: SHIPPED | OUTPATIENT
Start: 2017-11-08 | End: 2017-11-18

## 2017-11-08 NOTE — PROGRESS NOTES
Subjective:   Patient ID: Amanuel Jimenez is a 27 y.o. male.    Chief Complaint: URI      Cough   This is a new problem. The current episode started 1 to 4 weeks ago. The problem has been gradually worsening. The cough is productive of sputum and productive of purulent sputum. Associated symptoms include chills, ear congestion, a fever, headaches, myalgias, nasal congestion, rhinorrhea, a sore throat, shortness of breath, sweats, weight loss and wheezing. Pertinent negatives include no chest pain, ear pain, heartburn, hemoptysis or rash. The symptoms are aggravated by dust, exercise, fumes, lying down and cold air. He has tried nothing for the symptoms. The treatment provided no relief. There is no history of asthma.     26 yo male here bc of ongoing productive cough.    Patient queried and denies any further complaints.        ALLERGIES AND MEDICATIONS: updated and reviewed.  Review of patient's allergies indicates:   Allergen Reactions    Tylenol [acetaminophen] Nausea Only       Current Outpatient Prescriptions:     albuterol 90 mcg/actuation inhaler, Inhale 2 puffs into the lungs every 6 (six) hours as needed for Wheezing. Rescue, Disp: 18 g, Rfl: 0    azithromycin (Z-MARCELINO) 250 MG tablet, Take 2 tablets by mouth on day 1; Take 1 tablet by mouth on days 2-5, Disp: 6 tablet, Rfl: 0    docusate sodium (COLACE) 50 MG capsule, Take by mouth 2 (two) times daily., Disp: , Rfl:     methylPREDNISolone (MEDROL DOSEPACK) 4 mg tablet, use as directed, Disp: 1 Package, Rfl: 0    promethazine-codeine 6.25-10 mg/5 ml (PHENERGAN WITH CODEINE) 6.25-10 mg/5 mL syrup, Take 5 mLs by mouth every 4 (four) hours as needed for Cough., Disp: 118 mL, Rfl: 0    Review of Systems   Constitutional: Positive for chills, fever and weight loss.   HENT: Positive for rhinorrhea and sore throat. Negative for ear pain.    Respiratory: Positive for cough, shortness of breath and wheezing. Negative for hemoptysis.    Cardiovascular: Negative  "for chest pain and palpitations.   Gastrointestinal: Negative for heartburn.   Musculoskeletal: Positive for myalgias.   Skin: Negative for rash.   Neurological: Positive for headaches.       Objective:     Vitals:    11/08/17 1042   BP: 113/66   Pulse: 64   Temp: 98.4 °F (36.9 °C)   TempSrc: Oral   Weight: 74.2 kg (163 lb 9.3 oz)   Height: 5' 5" (1.651 m)   PainSc:   6     Body mass index is 27.22 kg/m².    Physical Exam   Constitutional: He appears well-developed and well-nourished.   HENT:   Head: Normocephalic and atraumatic.   Right Ear: Hearing, tympanic membrane, external ear and ear canal normal. No drainage or swelling. No decreased hearing is noted.   Left Ear: Hearing, tympanic membrane, external ear and ear canal normal. No drainage or swelling. No decreased hearing is noted.   Nose: Nose normal. No rhinorrhea.   Mouth/Throat: Oropharynx is clear and moist. No oropharyngeal exudate, posterior oropharyngeal edema or posterior oropharyngeal erythema.   Eyes: Conjunctivae, EOM and lids are normal. Pupils are equal, round, and reactive to light. Right eye exhibits no discharge and no exudate. Left eye exhibits no discharge and no exudate. Right conjunctiva is not injected. Left conjunctiva is not injected.   Neck: Trachea normal and full passive range of motion without pain. Normal carotid pulses, no hepatojugular reflux and no JVD present. Carotid bruit is not present. No neck rigidity. No edema and no erythema present. No thyroid mass and no thyromegaly present.   Cardiovascular: Normal rate, regular rhythm and normal heart sounds.    Pulmonary/Chest: Effort normal. No respiratory distress.   Abdominal: Soft. Normal appearance and bowel sounds are normal. There is no tenderness. There is negative Charlton's sign.   Lymphadenopathy:     He has no cervical adenopathy.   Neurological: He is alert.   Skin: Skin is warm and dry.   Psychiatric: He has a normal mood and affect. His speech is normal and behavior is " normal.       Assessment and Plan:   Amanuel ARCHIBALD was seen today for uri.    Diagnoses and all orders for this visit:    Acute bacterial bronchitis    Other orders  -     methylPREDNISolone (MEDROL DOSEPACK) 4 mg tablet; use as directed  -     azithromycin (Z-MARCELINO) 250 MG tablet; Take 2 tablets by mouth on day 1; Take 1 tablet by mouth on days 2-5  -     promethazine-codeine 6.25-10 mg/5 ml (PHENERGAN WITH CODEINE) 6.25-10 mg/5 mL syrup; Take 5 mLs by mouth every 4 (four) hours as needed for Cough.  -     albuterol 90 mcg/actuation inhaler; Inhale 2 puffs into the lungs every 6 (six) hours as needed for Wheezing. Rescue    Hydrate, rest, OTC Mucinex as directed, Nasal saline as needed.  OTC Zyrtec as directed.      Return in about 1 week (around 11/15/2017), or if symptoms worsen or fail to improve.    THIS NOTE WILL BE SHARED WITH THE PATIENT.

## 2018-04-18 ENCOUNTER — NURSE TRIAGE (OUTPATIENT)
Dept: ADMINISTRATIVE | Facility: CLINIC | Age: 28
End: 2018-04-18

## 2018-04-18 ENCOUNTER — HOSPITAL ENCOUNTER (EMERGENCY)
Facility: HOSPITAL | Age: 28
Discharge: HOME | End: 2018-04-18
Attending: EMERGENCY MEDICINE
Payer: COMMERCIAL

## 2018-04-18 VITALS
DIASTOLIC BLOOD PRESSURE: 66 MMHG | BODY MASS INDEX: 26.66 KG/M2 | RESPIRATION RATE: 20 BRPM | SYSTOLIC BLOOD PRESSURE: 118 MMHG | OXYGEN SATURATION: 97 % | WEIGHT: 160 LBS | HEART RATE: 65 BPM | HEIGHT: 65 IN | TEMPERATURE: 97 F

## 2018-04-18 DIAGNOSIS — S05.01XA ABRASION OF RIGHT CORNEA, INITIAL ENCOUNTER: Primary | ICD-10-CM

## 2018-04-18 PROCEDURE — 25000003 PHARM REV CODE 250: Performed by: EMERGENCY MEDICINE

## 2018-04-18 PROCEDURE — 99283 EMERGENCY DEPT VISIT LOW MDM: CPT

## 2018-04-18 RX ORDER — TOBRAMYCIN 3 MG/ML
2 SOLUTION/ DROPS OPHTHALMIC EVERY 4 HOURS
Qty: 5 ML | Refills: 0 | Status: SHIPPED | OUTPATIENT
Start: 2018-04-18 | End: 2018-09-07 | Stop reason: ALTCHOICE

## 2018-04-18 RX ORDER — PROPARACAINE HYDROCHLORIDE 5 MG/ML
1 SOLUTION/ DROPS OPHTHALMIC
Status: COMPLETED | OUTPATIENT
Start: 2018-04-18 | End: 2018-04-18

## 2018-04-18 RX ADMIN — FLUORESCEIN SODIUM 1 EACH: 1 STRIP OPHTHALMIC at 06:04

## 2018-04-18 RX ADMIN — PROPARACAINE HYDROCHLORIDE 1 DROP: 5 SOLUTION/ DROPS OPHTHALMIC at 06:04

## 2018-04-18 NOTE — ED TRIAGE NOTES
Pt presents to ED with c/o right eye discomfort. Pt states that he may have a piece of sheet metal or dirt in the eye. Incident occurred 3pm yesterday. Pt can see but vision is slightly blurred.

## 2018-04-18 NOTE — ED PROVIDER NOTES
Encounter Date: 4/18/2018    SCRIBE #1 NOTE: I, Jag Frankel, am scribing for, and in the presence of,  Dr. Escobar Merida. I have scribed the entire note.       History     Chief Complaint   Patient presents with    Eye Problem     Pt to ED with c/o right eye discomfort and irritation. Pt states the he thinks that there may be a piece of sheet metal or dirt. Pt is able to see out of the eye but vision is slightly blurry.      The history is provided by the patient.   Amanuel Jimenez is a 28 y.o. male who  has a past medical history of Rectal fistula.    The patient presents to the ED due to eye irritation from a foreign body that entered his right eye around 3PM yesterday. Patient reports that he works for a sheet metal company installing air ducts and believes that yesterday at work he either got a piece of metal or dirt stuck in his right eye. Patient denies any major visual disturbances or any other complaints. Patient states that he has no medical history and is not on any prescription medications.     Review of patient's allergies indicates:   Allergen Reactions    Tylenol [acetaminophen] Nausea Only     Past Medical History:   Diagnosis Date    Rectal fistula      Past Surgical History:   Procedure Laterality Date    COLONOSCOPY N/A 5/26/2017    Procedure: COLONOSCOPY;  Surgeon: Galen Weathers MD;  [facility]: [facility];  Service: Endoscopy;  Laterality: N/A;    cspan      rectal fistula repair       Family History   Problem Relation Age of Onset    Cancer Father      Social History   Substance Use Topics    Smoking status: Current Some Day Smoker     Packs/day: 0.50     Types: Cigarettes    Smokeless tobacco: Never Used      Comment: attempting to quit last smoke 5 days     Alcohol use Yes      Comment: rarely  but when he does binges for short periods      Review of Systems   Eyes: Positive for pain. Negative for visual disturbance.   All other systems reviewed and are negative.      Physical  Exam     Initial Vitals [04/18/18 0610]   BP Pulse Resp Temp SpO2   118/66 65 20 97.2 °F (36.2 °C) 97 %      MAP       83.33         Physical Exam    Nursing note and vitals reviewed.  Constitutional: He appears well-developed and well-nourished.   HENT:   Head: Normocephalic and atraumatic.   Mouth/Throat: Oropharynx is clear and moist.   Eyes: Right conjunctiva is injected.   Right eye shows conjunctival injection with a small metallic foreign body in the lower limbus. Punctate corneal abrasion at 11 O'clock at the central cornea with surrounding corneal denuding    Neck: Normal range of motion.   Cardiovascular: Normal rate and regular rhythm.   Pulmonary/Chest: No respiratory distress.   Abdominal: Bowel sounds are normal. He exhibits no distension. There is no tenderness.   Musculoskeletal: Normal range of motion.   Neurological: He is alert and oriented to person, place, and time. He has normal strength.   Skin: Skin is warm and dry. Capillary refill takes less than 2 seconds.   Psychiatric: He has a normal mood and affect. His behavior is normal. Thought content normal.         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  Presents for foreign body sensation to his right eye since yesterday.  Patient denies visual loss or blurred vision discharge photophobia or eye pain  Differential Diagnosis:   Foreign body, corneal abrasion, neuritis, iritis  ED Management:  Initial exam revealed a loose foreign body in the lower canthus.  A small corneal abrasion was noted in the center of the eye.  Foreign body was removed with Q-tip.                      Clinical Impression:   The encounter diagnosis was Abrasion of right cornea, initial encounter.    Disposition:   Disposition: Discharged  Condition: Stable                        Escobar Merida MD  04/18/18 0753

## 2018-04-18 NOTE — TELEPHONE ENCOUNTER
"    Reason for Disposition   [1] Sharp FB (even if FB was removed) AND [2] any pain present now    Answer Assessment - Initial Assessment Questions  1. TYPE OF FOREIGN BODY: "What got in the eye?"       Metal in eye or shavings from galvanized steel  2. ONSET: "When did it happen?"       13 hours  3. MECHANISM: "How did it happen?"       At work  4. VISION: "Do you have blurred vision?"       Not really  5. PAIN: "Is it painful?" If so, ask: "How bad is the pain?"  (Scale 1-10; or mild, moderate, severe)      Not really just irritated  6. CONTACTS: "Do you wear contacts?"      no  7. OTHER SYMPTOMS: "Do you have any other symptoms?"      Feel irritable and watering and feels like something is stuck in the eye  8. PREGNANCY: "Is there any chance you are pregnant?" "When was your last menstrual period?"      no    Protocols used: ST EYE - FOREIGN BODY-A-AH      "

## 2018-04-19 ENCOUNTER — HOSPITAL ENCOUNTER (EMERGENCY)
Facility: HOSPITAL | Age: 28
Discharge: HOME | End: 2018-04-19
Attending: EMERGENCY MEDICINE
Payer: COMMERCIAL

## 2018-04-19 VITALS
BODY MASS INDEX: 27.12 KG/M2 | OXYGEN SATURATION: 99 % | WEIGHT: 163 LBS | SYSTOLIC BLOOD PRESSURE: 132 MMHG | HEART RATE: 65 BPM | RESPIRATION RATE: 18 BRPM | TEMPERATURE: 98 F | DIASTOLIC BLOOD PRESSURE: 68 MMHG

## 2018-04-19 DIAGNOSIS — S05.00XA CORNEAL ABRASION, UNSPECIFIED LATERALITY, INITIAL ENCOUNTER: Primary | ICD-10-CM

## 2018-04-19 DIAGNOSIS — S05.01XA CORNEAL ABRASION, RIGHT, INITIAL ENCOUNTER: ICD-10-CM

## 2018-04-19 PROCEDURE — 99283 EMERGENCY DEPT VISIT LOW MDM: CPT | Mod: ,,, | Performed by: EMERGENCY MEDICINE

## 2018-04-19 PROCEDURE — 99284 EMERGENCY DEPT VISIT MOD MDM: CPT

## 2018-04-19 PROCEDURE — 25000003 PHARM REV CODE 250: Performed by: EMERGENCY MEDICINE

## 2018-04-19 RX ORDER — ONDANSETRON 8 MG/1
8 TABLET, ORALLY DISINTEGRATING ORAL
Status: COMPLETED | OUTPATIENT
Start: 2018-04-19 | End: 2018-04-19

## 2018-04-19 RX ORDER — HYDROCODONE BITARTRATE AND ACETAMINOPHEN 5; 325 MG/1; MG/1
1 TABLET ORAL
Status: COMPLETED | OUTPATIENT
Start: 2018-04-19 | End: 2018-04-19

## 2018-04-19 RX ORDER — PROPARACAINE HYDROCHLORIDE 5 MG/ML
1 SOLUTION/ DROPS OPHTHALMIC
Status: COMPLETED | OUTPATIENT
Start: 2018-04-19 | End: 2018-04-19

## 2018-04-19 RX ADMIN — ONDANSETRON 8 MG: 8 TABLET, ORALLY DISINTEGRATING ORAL at 05:04

## 2018-04-19 RX ADMIN — FLUORESCEIN SODIUM 1 EACH: 1 STRIP OPHTHALMIC at 05:04

## 2018-04-19 RX ADMIN — HYDROCODONE BITARTRATE AND ACETAMINOPHEN 1 TABLET: 5; 325 TABLET ORAL at 05:04

## 2018-04-19 RX ADMIN — PROPARACAINE HYDROCHLORIDE 1 DROP: 5 SOLUTION/ DROPS OPHTHALMIC at 05:04

## 2018-04-19 NOTE — ED NOTES
Pt arrives for evaluation of continued right eye pain after injury a few days ago - continued redness and tearing also - denies new trauma

## 2018-04-19 NOTE — ED PROVIDER NOTES
Encounter Date: 4/19/2018    SCRIBE #1 NOTE: I, Yakelin Nugent, am scribing for, and in the presence of,  Dr. Jules . I have scribed the entire note.       History     Chief Complaint   Patient presents with    Eye Problem     Thinks something is in right eye. Seen at [facility]- wednesday for same complaint, tx and given eye drops but remains in pain with out any improvement.      Time patient was seen by the provider: 5:04 PM      The patient is a 28 y.o. male who presents to the ED with a complaint of right eye pain. Pt had an injury 2 days prior - metal foreign body that entered his right eye. The patient reports he was seen at [facility] for same complaint yesterdaywhere metal was removed from the eye and was given Tobramycin eye drops. Patient states drops give no improvement and continues with pain and redness today. He describes pain as a burning sensation with scant white drainage. Patient is scheduled to see opthalmology tomorrow morning.  He was seen by his PCP this evening and was complaining of pain and nausea 2/2 the eye pain. He was prescribed norco and zofran and told to come to the ED for further evaluation. He denies changes in vision. Pain persists despite tobra drops and is so severe it is preventing sleep. No contact lens or corrective lenses.        The history is provided by the patient and medical records.     Review of patient's allergies indicates:  No Known Allergies  Past Medical History:   Diagnosis Date    Rectal fistula      Past Surgical History:   Procedure Laterality Date    COLONOSCOPY N/A 5/26/2017    Procedure: COLONOSCOPY;  Surgeon: Galen Weathers MD;  Location: [facility];  Service: Endoscopy;  Laterality: N/A;    cspan      rectal fistula repair       Family History   Problem Relation Age of Onset    Cancer Father      Social History   Substance Use Topics    Smoking status: Current Some Day Smoker     Packs/day: 0.50     Types: Cigarettes    Smokeless tobacco:  Never Used      Comment: attempting to quit last smoke 5 days     Alcohol use Yes      Comment: rarely  but when he does binges for short periods      Review of Systems   Constitutional: Negative for fever.   Eyes: Positive for pain (right eye), discharge (right eye) and redness (right eye).   Gastrointestinal: Positive for vomiting.       Physical Exam     Initial Vitals [04/19/18 1659]   BP Pulse Resp Temp SpO2   132/68 65 18 98.4 °F (36.9 °C) 99 %      MAP       89.33         Physical Exam    Vitals reviewed.  Constitutional: No distress.   HENT:   Head: Normocephalic and atraumatic.   Eyes: EOM and lids are normal. Pupils are equal, round, and reactive to light. Lids are everted and swept, no foreign bodies found. Right eye exhibits chemosis. Right eye exhibits no discharge, no exudate and no hordeolum. No foreign body present in the right eye. Right conjunctiva is injected. No scleral icterus.   Slit lamp exam:       The right eye shows corneal abrasion.       Right eye significant with scleral injection. There is fluorescein uptake from 6 to 8 o'clock position, approx 25-30% of cornea along the inferior lateral aspect of the cornea.           ED Course   Procedures  Labs Reviewed - No data to display          Medical Decision Making:   History:   Old Medical Records: I decided to obtain old medical records.  Initial Assessment:   28 y.o. Male presents with foreign body in right eye with persistent tearing and pain at that sight. I see a large corneal abrasion but upon further investigation there is no evidence of foreign body. Visual acuity is intact. Discussed with opthalmology on call, will investigate for further metallic foreign body with CT scan.    Reassessment: Pt reports significant improvement in symptoms s/p norco and zofran.  CT neg for foreign body - possible small conjunctival abscess vs. Cellulitis. Clinically no abscess at this time. Pt's visual acuity intact. Patient will follow-up tomorrow  with opthalmology in clinic. Indications to return. Voices understanding and compliance with plan.  Clinical Tests:   Radiological Study: Ordered and Reviewed        Imaging Results          CT Orbits Sella Post Fossa Without Cont (Final result)  Result time 04/19/18 21:02:43    Final result by Trey Butts MD (04/19/18 21:02:43)                 Impression:      Right preseptal soft tissue swelling and small conjunctival fluid collection, possibly lacrimal fluid or a small conjunctival abscess secondary to conjunctivitis.  No foreign body identified.  Recommend correlation with direct visualization.      Electronically signed by: Trey Butts MD  Date:    04/19/2018  Time:    21:02             Narrative:    EXAMINATION:  CT ORBITS SELLA POST FOSSA WITHOUT CONT    CLINICAL HISTORY:  Trauma to eye;please eval for metal in right eye;    TECHNIQUE:  CT images of the orbital bones without contrast.  Coronal and sagittal reconstructions were created.    COMPARISON:  None.    FINDINGS:  No evidence of acute facial fracture.  There is a moderate degree of soft tissue swelling at the superficial aspect of the right eye.  There is a small conjunctival fluid collection on the right measuring up to 3 mm in AP diameter (series 4, image 31).  Evaluation of this collection is limited in the absence of IV contrast.  No radiopaque foreign body is seen.  Retrobulbar fat is preserved.  Globes are symmetric.    Mild mucosal thickening involving the maxillary sinuses.  Small mucosal retention cyst or polyp in the inferior left maxillary sinus.  Visualized paranasal sinuses and mastoid air cells are otherwise clear.                                     Scribe Attestation:   Scribe #1: I performed the above scribed service and the documentation accurately describes the services I performed. I attest to the accuracy of the note.               Clinical Impression:   The encounter diagnosis was Corneal abrasion, unspecified  laterality, initial encounter.                           Malcolm Jules MD  04/19/18 4274

## 2018-04-20 PROBLEM — S05.01XA CORNEAL ABRASION, RIGHT, INITIAL ENCOUNTER: Status: ACTIVE | Noted: 2018-04-20

## 2018-04-20 PROCEDURE — 92004 COMPRE OPH EXAM NEW PT 1/>: CPT | Mod: S$GLB,,, | Performed by: OPHTHALMOLOGY

## 2018-04-20 PROCEDURE — 65400 REMOVAL OF EYE LESION: CPT | Mod: 79,RT,S$GLB, | Performed by: OPHTHALMOLOGY

## 2018-04-23 ENCOUNTER — OFFICE VISIT (OUTPATIENT)
Dept: OPHTHALMOLOGY | Facility: CLINIC | Age: 28
End: 2018-04-23
Payer: COMMERCIAL

## 2018-04-23 DIAGNOSIS — S05.01XA CORNEAL ABRASION, RIGHT, INITIAL ENCOUNTER: Primary | ICD-10-CM

## 2018-04-23 PROCEDURE — 92014 COMPRE OPH EXAM EST PT 1/>: CPT | Mod: S$GLB,,, | Performed by: OPHTHALMOLOGY

## 2018-04-23 PROCEDURE — 99999 PR PBB SHADOW E&M-EST. PATIENT-LVL II: CPT | Mod: PBBFAC,,, | Performed by: OPHTHALMOLOGY

## 2018-04-23 NOTE — PROGRESS NOTES
HPI     ER f/up    K abrasion OD    Patient reports doing much better, no pain. No discharge.       Tobramycin TID OD  AT's PRN OD    Last edited by Yamilet Elias MD on 4/23/2018 11:33 AM. (History)            Assessment /Plan     For exam results, see Encounter Report.    Corneal abrasion, right, initial encounter      BCL removed, epi healed.    Okay to stop abx, ATs prn    F/up optom 1 yr - REE

## 2018-06-06 ENCOUNTER — OFFICE VISIT (OUTPATIENT)
Dept: INTERNAL MEDICINE | Facility: CLINIC | Age: 28
End: 2018-06-06
Payer: COMMERCIAL

## 2018-06-06 ENCOUNTER — TELEPHONE (OUTPATIENT)
Dept: INTERNAL MEDICINE | Facility: CLINIC | Age: 28
End: 2018-06-06

## 2018-06-06 VITALS
DIASTOLIC BLOOD PRESSURE: 70 MMHG | SYSTOLIC BLOOD PRESSURE: 113 MMHG | HEART RATE: 71 BPM | HEIGHT: 65 IN | WEIGHT: 166.44 LBS | OXYGEN SATURATION: 99 % | BODY MASS INDEX: 27.73 KG/M2 | TEMPERATURE: 98 F

## 2018-06-06 DIAGNOSIS — B96.89 ACUTE BACTERIAL BRONCHITIS: Primary | ICD-10-CM

## 2018-06-06 DIAGNOSIS — J20.8 ACUTE BACTERIAL BRONCHITIS: Primary | ICD-10-CM

## 2018-06-06 PROCEDURE — 99999 PR PBB SHADOW E&M-EST. PATIENT-LVL III: CPT | Mod: PBBFAC,,, | Performed by: FAMILY MEDICINE

## 2018-06-06 PROCEDURE — 99213 OFFICE O/P EST LOW 20 MIN: CPT | Mod: S$GLB,,, | Performed by: FAMILY MEDICINE

## 2018-06-06 PROCEDURE — 3008F BODY MASS INDEX DOCD: CPT | Mod: CPTII,S$GLB,, | Performed by: FAMILY MEDICINE

## 2018-06-06 RX ORDER — AZITHROMYCIN 250 MG/1
TABLET, FILM COATED ORAL
Qty: 6 TABLET | Refills: 0 | Status: SHIPPED | OUTPATIENT
Start: 2018-06-06 | End: 2018-09-07 | Stop reason: ALTCHOICE

## 2018-06-06 RX ORDER — PROMETHAZINE HYDROCHLORIDE AND CODEINE PHOSPHATE 6.25; 1 MG/5ML; MG/5ML
5 SOLUTION ORAL NIGHTLY PRN
Qty: 240 ML | Refills: 0 | Status: SHIPPED | OUTPATIENT
Start: 2018-06-06 | End: 2018-06-16

## 2018-06-06 RX ORDER — METHYLPREDNISOLONE 4 MG/1
TABLET ORAL
Qty: 1 PACKAGE | Refills: 0 | Status: SHIPPED | OUTPATIENT
Start: 2018-06-06 | End: 2018-09-07 | Stop reason: ALTCHOICE

## 2018-06-06 NOTE — TELEPHONE ENCOUNTER
----- Message from Daniel Mora MD sent at 6/6/2018  7:18 AM CDT -----  At about 4 am, pt made an afternoon appt for today. If he is actively dyspneic, then I can't evaluate him appropriately in clinic and he should go to ER. Thx

## 2018-06-06 NOTE — PROGRESS NOTES
Subjective:   Patient ID: Amanuel Jimenez is a 28 y.o. male.    Chief Complaint: Nasal Congestion; Chest Congestion; and Wheezing      Cough   This is a new problem. The current episode started 1 to 4 weeks ago. The problem has been gradually worsening. The cough is productive of brown sputum, productive of purulent sputum and productive of sputum. Associated symptoms include chest pain, chills, ear congestion, ear pain, a fever, headaches, myalgias, nasal congestion, postnasal drip, a sore throat, shortness of breath, sweats, weight loss and wheezing. Pertinent negatives include no rash or rhinorrhea. The symptoms are aggravated by lying down, dust, exercise and fumes. He has tried nothing for the symptoms. The treatment provided no relief.       Patient queried and denies any further complaints.        ALLERGIES AND MEDICATIONS: updated and reviewed.  Review of patient's allergies indicates:  No Known Allergies    Current Outpatient Prescriptions:     albuterol 90 mcg/actuation inhaler, Inhale 2 puffs into the lungs every 6 (six) hours as needed for Wheezing. Rescue, Disp: 18 g, Rfl: 0    ondansetron (ZOFRAN-ODT) 8 MG TbDL, Take 1 tablet (8 mg total) by mouth every 6 (six) hours as needed., Disp: 30 tablet, Rfl: 0    tobramycin sulfate 0.3% (TOBREX) 0.3 % ophthalmic solution, Place 2 drops into the right eye every 4 (four) hours., Disp: 5 mL, Rfl: 0    azithromycin (Z-MARCELINO) 250 MG tablet, Take 2 tablets by mouth on day 1; Take 1 tablet by mouth on days 2-5, Disp: 6 tablet, Rfl: 0    methylPREDNISolone (MEDROL DOSEPACK) 4 mg tablet, use as directed, Disp: 1 Package, Rfl: 0    promethazine-codeine 6.25-10 mg/5 ml (PHENERGAN WITH CODEINE) 6.25-10 mg/5 mL syrup, Take 5 mLs by mouth nightly as needed. Do not take and drive., Disp: 240 mL, Rfl: 0    Review of Systems   Constitutional: Positive for chills, fever and weight loss. Negative for activity change, appetite change, diaphoresis, fatigue and unexpected  "weight change.   HENT: Positive for ear pain, postnasal drip and sore throat. Negative for congestion, ear discharge, rhinorrhea and sneezing.    Eyes: Negative for photophobia and discharge.   Respiratory: Positive for cough, shortness of breath and wheezing. Negative for chest tightness.    Cardiovascular: Positive for chest pain. Negative for palpitations.   Gastrointestinal: Negative for abdominal distention, abdominal pain, diarrhea, nausea and vomiting.   Genitourinary: Negative for dysuria.   Musculoskeletal: Positive for myalgias. Negative for arthralgias and neck pain.   Skin: Negative for rash.   Neurological: Positive for headaches.       Objective:     Vitals:    06/06/18 1122   BP: 113/70   Pulse: 71   Temp: 97.6 °F (36.4 °C)   TempSrc: Oral   SpO2: 99%   Weight: 75.5 kg (166 lb 7.2 oz)   Height: 5' 5" (1.651 m)   PainSc: 0-No pain     Body mass index is 27.7 kg/m².    Physical Exam   Constitutional: He appears well-developed and well-nourished.   HENT:   Head: Normocephalic and atraumatic.   Right Ear: Hearing, tympanic membrane, external ear and ear canal normal. No drainage or swelling. No decreased hearing is noted.   Left Ear: Hearing, tympanic membrane, external ear and ear canal normal. No drainage or swelling. No decreased hearing is noted.   Nose: Nose normal. No rhinorrhea.   Mouth/Throat: Posterior oropharyngeal erythema present. No oropharyngeal exudate or posterior oropharyngeal edema.   Eyes: Conjunctivae, EOM and lids are normal. Pupils are equal, round, and reactive to light. Right eye exhibits no discharge and no exudate. Left eye exhibits no discharge and no exudate. Right conjunctiva is not injected. Left conjunctiva is not injected.   Neck: Trachea normal and full passive range of motion without pain. Normal carotid pulses, no hepatojugular reflux and no JVD present. Carotid bruit is not present. No neck rigidity. No edema and no erythema present. No thyroid mass and no thyromegaly " present.   Cardiovascular: Normal rate, regular rhythm and normal heart sounds.    Pulmonary/Chest: Effort normal. No respiratory distress.   Abdominal: Soft. Normal appearance and bowel sounds are normal. There is no tenderness. There is negative Charlton's sign.   Lymphadenopathy:     He has no cervical adenopathy.   Neurological: He is alert.   Skin: Skin is warm and dry.   Psychiatric: He has a normal mood and affect. His speech is normal and behavior is normal.       Assessment and Plan:   Amanuel ARCHIBALD was seen today for nasal congestion, chest congestion and wheezing.    Diagnoses and all orders for this visit:    Acute bacterial bronchitis    Other orders  -     azithromycin (Z-MARCELINO) 250 MG tablet; Take 2 tablets by mouth on day 1; Take 1 tablet by mouth on days 2-5  -     methylPREDNISolone (MEDROL DOSEPACK) 4 mg tablet; use as directed  -     promethazine-codeine 6.25-10 mg/5 ml (PHENERGAN WITH CODEINE) 6.25-10 mg/5 mL syrup; Take 5 mLs by mouth nightly as needed. Do not take and drive.    Hydrate, rest, OTC Mucinex Expectorant as directed, Nasal saline as needed.  OTC Zyrtec as directed.      Follow-up in about 2 weeks (around 6/20/2018), or if symptoms worsen or fail to improve.    THIS NOTE WILL BE SHARED WITH THE PATIENT.

## 2018-09-07 ENCOUNTER — OFFICE VISIT (OUTPATIENT)
Dept: INTERNAL MEDICINE | Facility: CLINIC | Age: 28
End: 2018-09-07
Payer: COMMERCIAL

## 2018-09-07 VITALS
HEIGHT: 65 IN | DIASTOLIC BLOOD PRESSURE: 62 MMHG | HEART RATE: 66 BPM | RESPIRATION RATE: 18 BRPM | OXYGEN SATURATION: 98 % | WEIGHT: 166.88 LBS | SYSTOLIC BLOOD PRESSURE: 108 MMHG | TEMPERATURE: 98 F | BODY MASS INDEX: 27.81 KG/M2

## 2018-09-07 DIAGNOSIS — H60.501 ACUTE OTITIS EXTERNA OF RIGHT EAR, UNSPECIFIED TYPE: Primary | ICD-10-CM

## 2018-09-07 PROBLEM — S05.01XA CORNEAL ABRASION, RIGHT, INITIAL ENCOUNTER: Status: RESOLVED | Noted: 2018-04-20 | Resolved: 2018-09-07

## 2018-09-07 PROCEDURE — 99999 PR PBB SHADOW E&M-EST. PATIENT-LVL III: CPT | Mod: PBBFAC,,, | Performed by: FAMILY MEDICINE

## 2018-09-07 PROCEDURE — 99213 OFFICE O/P EST LOW 20 MIN: CPT | Mod: S$GLB,,, | Performed by: FAMILY MEDICINE

## 2018-09-07 RX ORDER — CIPROFLOXACIN AND DEXAMETHASONE 3; 1 MG/ML; MG/ML
4 SUSPENSION/ DROPS AURICULAR (OTIC) 2 TIMES DAILY
Qty: 7.5 ML | Refills: 1 | Status: SHIPPED | OUTPATIENT
Start: 2018-09-07 | End: 2018-09-24

## 2018-09-07 NOTE — PROGRESS NOTES
"Subjective:   Patient ID: Amanuel Jimenez is a 28 y.o. male.    Chief Complaint: Otalgia ( right, x 1 week. left has began to hurt as well)      HPI  27 yo with right ear pain. No other URI symptoms. No fever or chills and no hearing loss. No trauma.    Patient queried and denies any further complaints.    ALLERGIES AND MEDICATIONS: updated and reviewed.  Review of patient's allergies indicates:  No Known Allergies    Current Outpatient Medications:     albuterol 90 mcg/actuation inhaler, Inhale 2 puffs into the lungs every 6 (six) hours as needed for Wheezing. Rescue, Disp: 18 g, Rfl: 0    ciprofloxacin-dexamethasone 0.3-0.1% (CIPRODEX) 0.3-0.1 % DrpS, Place 4 drops into the right ear 2 (two) times daily. X 5 days, Disp: 7.5 mL, Rfl: 1    Review of Systems   Constitutional: Negative for activity change, appetite change, chills, diaphoresis, fatigue, fever and unexpected weight change.   HENT: Negative for congestion, ear discharge, ear pain, postnasal drip, rhinorrhea, sneezing and sore throat.    Eyes: Negative for photophobia and discharge.   Respiratory: Negative for cough, chest tightness, shortness of breath and wheezing.    Cardiovascular: Negative for chest pain and palpitations.   Gastrointestinal: Negative for abdominal distention, abdominal pain, diarrhea, nausea and vomiting.   Genitourinary: Negative for dysuria.   Musculoskeletal: Negative for arthralgias and neck pain.   Skin: Negative for rash.   Neurological: Negative for headaches.       Objective:     Vitals:    09/07/18 1045   BP: 108/62   Pulse: 66   Resp: 18   Temp: 98.4 °F (36.9 °C)   SpO2: 98%   Weight: 75.7 kg (166 lb 14.2 oz)   Height: 5' 5" (1.651 m)   PainSc:   4   PainLoc: Ear     Body mass index is 27.77 kg/m².    Physical Exam   Constitutional: He appears well-developed and well-nourished.   HENT:   Head: Normocephalic and atraumatic.   Right Ear: Hearing, tympanic membrane, external ear and ear canal normal. No drainage or swelling. " No decreased hearing is noted.   Left Ear: Hearing, tympanic membrane, external ear and ear canal normal. No drainage or swelling. No decreased hearing is noted.   Nose: Nose normal. No rhinorrhea.   Mouth/Throat: Oropharynx is clear and moist. No oropharyngeal exudate, posterior oropharyngeal edema or posterior oropharyngeal erythema.   Eyes: Conjunctivae, EOM and lids are normal. Pupils are equal, round, and reactive to light. Right eye exhibits no discharge and no exudate. Left eye exhibits no discharge and no exudate. Right conjunctiva is not injected. Left conjunctiva is not injected.   Neck: Trachea normal and full passive range of motion without pain. Normal carotid pulses, no hepatojugular reflux and no JVD present. Carotid bruit is not present. No neck rigidity. No edema and no erythema present. No thyroid mass and no thyromegaly present.   Cardiovascular: Normal rate, regular rhythm and normal heart sounds.   Pulmonary/Chest: Effort normal. No respiratory distress.   Abdominal: Soft. Normal appearance and bowel sounds are normal. There is no tenderness. There is negative Charlton's sign.   Lymphadenopathy:     He has no cervical adenopathy.   Neurological: He is alert.   Skin: Skin is warm and dry.   Psychiatric: He has a normal mood and affect. His speech is normal and behavior is normal.   Nursing note and vitals reviewed.      Assessment and Plan:   Amanuel ARCHIBALD was seen today for otalgia.    Diagnoses and all orders for this visit:    Acute otitis externa of right ear, unspecified type    Other orders  -     ciprofloxacin-dexamethasone 0.3-0.1% (CIPRODEX) 0.3-0.1 % DrpS; Place 4 drops into the right ear 2 (two) times daily. X 5 days        Follow-up in about 2 weeks (around 9/21/2018), or if symptoms worsen or fail to improve.    THIS NOTE WILL BE SHARED WITH THE PATIENT.

## 2018-09-24 ENCOUNTER — OFFICE VISIT (OUTPATIENT)
Dept: OPHTHALMOLOGY | Facility: CLINIC | Age: 28
End: 2018-09-24
Payer: COMMERCIAL

## 2018-09-24 DIAGNOSIS — H18.529 ANTERIOR BASEMENT MEMBRANE DYSTROPHY: Primary | ICD-10-CM

## 2018-09-24 PROCEDURE — 99999 PR PBB SHADOW E&M-EST. PATIENT-LVL II: CPT | Mod: PBBFAC,,, | Performed by: OPHTHALMOLOGY

## 2018-09-24 PROCEDURE — 92014 COMPRE OPH EXAM EST PT 1/>: CPT | Mod: S$GLB,,, | Performed by: OPHTHALMOLOGY

## 2018-09-24 NOTE — PROGRESS NOTES
HPI     Urgent     H/o K abrasion OD    Patient is here fot blurry vision OD upon waking, pt sts he uses OTC tears   and made OD much better. Pt denies itchy,burning,watery and pain. No   flashes or floaters. No Redness        Last edited by Yamilet Elias MD on 9/24/2018  1:41 PM. (History)            Assessment /Plan     For exam results, see Encounter Report.    Anterior basement membrane dystrophy      H/o abrasion OD -  Healed, resultant ABMD OD    No abrasion today.  Cont lubrication, gel qhs.    Establish care with optom.

## 2018-11-19 ENCOUNTER — OFFICE VISIT (OUTPATIENT)
Dept: INTERNAL MEDICINE | Facility: CLINIC | Age: 28
End: 2018-11-19
Payer: COMMERCIAL

## 2018-11-19 VITALS
TEMPERATURE: 98 F | HEART RATE: 64 BPM | RESPIRATION RATE: 14 BRPM | SYSTOLIC BLOOD PRESSURE: 118 MMHG | HEIGHT: 65 IN | WEIGHT: 164.88 LBS | DIASTOLIC BLOOD PRESSURE: 65 MMHG | BODY MASS INDEX: 27.47 KG/M2

## 2018-11-19 DIAGNOSIS — F17.200 SMOKER: ICD-10-CM

## 2018-11-19 DIAGNOSIS — B96.89 ACUTE BACTERIAL BRONCHITIS: Primary | ICD-10-CM

## 2018-11-19 DIAGNOSIS — J20.8 ACUTE BACTERIAL BRONCHITIS: Primary | ICD-10-CM

## 2018-11-19 DIAGNOSIS — J45.40 MODERATE PERSISTENT ASTHMA WITHOUT COMPLICATION: ICD-10-CM

## 2018-11-19 PROCEDURE — 99214 OFFICE O/P EST MOD 30 MIN: CPT | Mod: 25,S$GLB,, | Performed by: FAMILY MEDICINE

## 2018-11-19 PROCEDURE — 99999 PR PBB SHADOW E&M-EST. PATIENT-LVL III: CPT | Mod: PBBFAC,,, | Performed by: FAMILY MEDICINE

## 2018-11-19 PROCEDURE — 96372 THER/PROPH/DIAG INJ SC/IM: CPT | Mod: S$GLB,,, | Performed by: FAMILY MEDICINE

## 2018-11-19 RX ORDER — ALBUTEROL SULFATE 90 UG/1
2 AEROSOL, METERED RESPIRATORY (INHALATION) EVERY 6 HOURS PRN
Qty: 18 G | Refills: 5 | Status: SHIPPED | OUTPATIENT
Start: 2018-11-19 | End: 2019-04-24 | Stop reason: SDUPTHER

## 2018-11-19 RX ORDER — TRIAMCINOLONE ACETONIDE 40 MG/ML
40 INJECTION, SUSPENSION INTRA-ARTICULAR; INTRAMUSCULAR
Status: COMPLETED | OUTPATIENT
Start: 2018-11-19 | End: 2018-11-19

## 2018-11-19 RX ORDER — METHYLPREDNISOLONE 4 MG/1
TABLET ORAL
Qty: 1 PACKAGE | Refills: 0 | Status: SHIPPED | OUTPATIENT
Start: 2018-11-19 | End: 2018-12-10

## 2018-11-19 RX ORDER — AMOXICILLIN 875 MG/1
875 TABLET, FILM COATED ORAL EVERY 12 HOURS
Qty: 20 TABLET | Refills: 0 | Status: SHIPPED | OUTPATIENT
Start: 2018-11-19 | End: 2018-12-27 | Stop reason: ALTCHOICE

## 2018-11-19 RX ORDER — PROMETHAZINE HYDROCHLORIDE AND CODEINE PHOSPHATE 6.25; 1 MG/5ML; MG/5ML
5 SOLUTION ORAL NIGHTLY PRN
Qty: 118 ML | Refills: 0 | Status: SHIPPED | OUTPATIENT
Start: 2018-11-19 | End: 2018-11-29

## 2018-11-19 RX ADMIN — TRIAMCINOLONE ACETONIDE 40 MG: 40 INJECTION, SUSPENSION INTRA-ARTICULAR; INTRAMUSCULAR at 09:11

## 2018-11-19 NOTE — PROGRESS NOTES
Subjective:   Patient ID: Amanuel Jimenez is a 28 y.o. male.    Chief Complaint: URI      Cough   This is a new problem. The current episode started 1 to 4 weeks ago. The problem has been gradually worsening. The cough is productive of sputum. Associated symptoms include chest pain, chills, ear congestion, ear pain, headaches, nasal congestion, postnasal drip and sweats. Pertinent negatives include no fever, rash, rhinorrhea, sore throat, shortness of breath or wheezing. The symptoms are aggravated by dust, exercise and fumes. The treatment provided no relief. His past medical history is significant for asthma.       Patient queried and denies any further complaints.      ALLERGIES AND MEDICATIONS: updated and reviewed.  Review of patient's allergies indicates:  No Known Allergies    Current Outpatient Medications:     albuterol (PROVENTIL/VENTOLIN HFA) 90 mcg/actuation inhaler, Inhale 2 puffs into the lungs every 6 (six) hours as needed for Wheezing. Rescue, Disp: 18 g, Rfl: 5    amoxicillin (AMOXIL) 875 MG tablet, Take 1 tablet (875 mg total) by mouth every 12 (twelve) hours. X 10 days, Disp: 20 tablet, Rfl: 0    methylPREDNISolone (MEDROL DOSEPACK) 4 mg tablet, use as directed, Disp: 1 Package, Rfl: 0    promethazine-codeine 6.25-10 mg/5 ml (PHENERGAN WITH CODEINE) 6.25-10 mg/5 mL syrup, Take 5 mLs by mouth nightly as needed for Cough., Disp: 118 mL, Rfl: 0  No current facility-administered medications for this visit.     Review of Systems   Constitutional: Positive for chills. Negative for activity change, appetite change, diaphoresis, fatigue, fever and unexpected weight change.   HENT: Positive for ear pain and postnasal drip. Negative for congestion, ear discharge, rhinorrhea, sneezing and sore throat.    Eyes: Negative for photophobia and discharge.   Respiratory: Positive for cough. Negative for chest tightness, shortness of breath and wheezing.    Cardiovascular: Positive for chest pain. Negative for  "palpitations.   Gastrointestinal: Negative for abdominal distention, abdominal pain, diarrhea, nausea and vomiting.   Genitourinary: Negative for dysuria.   Musculoskeletal: Negative for arthralgias and neck pain.   Skin: Negative for rash.   Neurological: Positive for headaches.       Objective:     Vitals:    11/19/18 0847   BP: 118/65   Pulse: 64   Resp: 14   Temp: 98.3 °F (36.8 °C)   TempSrc: Oral   Weight: 74.8 kg (164 lb 14.5 oz)   Height: 5' 5" (1.651 m)   PainSc: 0-No pain     Body mass index is 27.44 kg/m².    Physical Exam   Constitutional: He appears well-developed and well-nourished.   HENT:   Head: Normocephalic and atraumatic.   Right Ear: Hearing, tympanic membrane, external ear and ear canal normal. No drainage or swelling. No decreased hearing is noted.   Left Ear: Hearing, tympanic membrane, external ear and ear canal normal. No drainage or swelling. No decreased hearing is noted.   Nose: Nose normal. No rhinorrhea.   Mouth/Throat: Oropharynx is clear and moist. No oropharyngeal exudate, posterior oropharyngeal edema or posterior oropharyngeal erythema.   Eyes: Conjunctivae, EOM and lids are normal. Pupils are equal, round, and reactive to light. Right eye exhibits no discharge and no exudate. Left eye exhibits no discharge and no exudate. Right conjunctiva is not injected. Left conjunctiva is not injected.   Neck: Trachea normal and full passive range of motion without pain. Normal carotid pulses, no hepatojugular reflux and no JVD present. Carotid bruit is not present. No neck rigidity. No edema and no erythema present. No thyroid mass and no thyromegaly present.   Cardiovascular: Normal rate, regular rhythm and normal heart sounds.   Pulmonary/Chest: Effort normal. No respiratory distress.   Abdominal: Soft. Normal appearance and bowel sounds are normal. There is no tenderness. There is negative Charlton's sign.   Lymphadenopathy:     He has no cervical adenopathy.   Neurological: He is alert. "   Skin: Skin is warm and dry.   Psychiatric: He has a normal mood and affect. His speech is normal and behavior is normal. Agitated: irritable.   Irritable about how many URIs he's getting. Still smokes.   Nursing note and vitals reviewed.      Assessment and Plan:   Amanuel ARCHIBALD was seen today for uri.    Diagnoses and all orders for this visit:    Acute bacterial bronchitis    Moderate persistent asthma without complication    Smoker    Other orders  -     triamcinolone acetonide injection 40 mg  -     promethazine-codeine 6.25-10 mg/5 ml (PHENERGAN WITH CODEINE) 6.25-10 mg/5 mL syrup; Take 5 mLs by mouth nightly as needed for Cough.  -     methylPREDNISolone (MEDROL DOSEPACK) 4 mg tablet; use as directed  -     amoxicillin (AMOXIL) 875 MG tablet; Take 1 tablet (875 mg total) by mouth every 12 (twelve) hours. X 10 days  -     albuterol (PROVENTIL/VENTOLIN HFA) 90 mcg/actuation inhaler; Inhale 2 puffs into the lungs every 6 (six) hours as needed for Wheezing. Rescue      Needs inhaled corticosteroid. Declined.  Needs PFT. Will consider such  Needs to stop smoking. Declined.    Hydrate, rest, OTC Mucinex Expectorant as directed, Nasal saline as needed.  OTC Zyrtec as directed.    Follow-up in about 2 weeks (around 12/3/2018), or if symptoms worsen or fail to improve.    THIS NOTE WILL BE SHARED WITH THE PATIENT.

## 2018-11-28 RX ORDER — PROMETHAZINE HYDROCHLORIDE AND CODEINE PHOSPHATE 6.25; 1 MG/5ML; MG/5ML
5 SOLUTION ORAL NIGHTLY PRN
Qty: 118 ML | Refills: 0 | OUTPATIENT
Start: 2018-11-28 | End: 2018-12-08

## 2018-12-27 ENCOUNTER — OFFICE VISIT (OUTPATIENT)
Dept: INTERNAL MEDICINE | Facility: CLINIC | Age: 28
End: 2018-12-27
Payer: COMMERCIAL

## 2018-12-27 VITALS
HEIGHT: 65 IN | SYSTOLIC BLOOD PRESSURE: 102 MMHG | TEMPERATURE: 98 F | HEART RATE: 77 BPM | WEIGHT: 167.13 LBS | DIASTOLIC BLOOD PRESSURE: 70 MMHG | BODY MASS INDEX: 27.85 KG/M2

## 2018-12-27 DIAGNOSIS — J06.9 UPPER RESPIRATORY TRACT INFECTION, UNSPECIFIED TYPE: Primary | ICD-10-CM

## 2018-12-27 PROCEDURE — 99999 PR PBB SHADOW E&M-EST. PATIENT-LVL III: CPT | Mod: PBBFAC,,, | Performed by: FAMILY MEDICINE

## 2018-12-27 PROCEDURE — 99213 OFFICE O/P EST LOW 20 MIN: CPT | Mod: S$GLB,,, | Performed by: FAMILY MEDICINE

## 2018-12-27 RX ORDER — METHYLPREDNISOLONE 4 MG/1
TABLET ORAL
Qty: 1 PACKAGE | Refills: 0 | Status: SHIPPED | OUTPATIENT
Start: 2018-12-27 | End: 2019-01-17

## 2018-12-27 NOTE — PROGRESS NOTES
Subjective:   Patient ID: Amanuel Jimenez is a 28 y.o. male.    Chief Complaint: Sinusitis      Otalgia    There is pain in both ears. This is a new problem. The current episode started 1 to 4 weeks ago. The problem occurs constantly. The problem has been waxing and waning. There has been no fever. The fever has been present for less than 1 day. The pain is at a severity of 5/10. The pain is mild. Associated symptoms include coughing, headaches and a sore throat. Pertinent negatives include no abdominal pain, diarrhea, ear discharge, neck pain, rash, rhinorrhea or vomiting. He has tried NSAIDs for the symptoms. The treatment provided no relief. His past medical history is significant for a tympanostomy tube. There is no history of a chronic ear infection or hearing loss.       Patient queried and denies any further complaints.      ALLERGIES AND MEDICATIONS: updated and reviewed.  Review of patient's allergies indicates:  No Known Allergies    Current Outpatient Medications:     albuterol (PROVENTIL/VENTOLIN HFA) 90 mcg/actuation inhaler, Inhale 2 puffs into the lungs every 6 (six) hours as needed for Wheezing. Rescue, Disp: 18 g, Rfl: 5    methylPREDNISolone (MEDROL DOSEPACK) 4 mg tablet, use as directed, Disp: 1 Package, Rfl: 0    Review of Systems   Constitutional: Negative for activity change, appetite change, chills, diaphoresis, fatigue, fever and unexpected weight change.   HENT: Positive for ear pain and sore throat. Negative for congestion, ear discharge, postnasal drip, rhinorrhea and sneezing.    Eyes: Negative for photophobia and discharge.   Respiratory: Positive for cough. Negative for chest tightness, shortness of breath and wheezing.    Cardiovascular: Negative for chest pain and palpitations.   Gastrointestinal: Negative for abdominal distention, abdominal pain, diarrhea, nausea and vomiting.   Genitourinary: Negative for dysuria.   Musculoskeletal: Negative for arthralgias and neck pain.   Skin:  "Negative for rash.   Neurological: Positive for headaches.       Objective:     Vitals:    12/27/18 0957   BP: 102/70   Pulse: 77   Temp: 98 °F (36.7 °C)   TempSrc: Oral   Weight: 75.8 kg (167 lb 1.7 oz)   Height: 5' 5" (1.651 m)   PainSc:   5   PainLoc: Ear     Body mass index is 27.81 kg/m².    Physical Exam   Constitutional: He appears well-developed and well-nourished.   HENT:   Head: Normocephalic and atraumatic.   Right Ear: Hearing, tympanic membrane, external ear and ear canal normal. No drainage or swelling. No decreased hearing is noted.   Left Ear: Hearing, tympanic membrane, external ear and ear canal normal. No drainage or swelling. No decreased hearing is noted.   Nose: Nose normal. No rhinorrhea.   Mouth/Throat: Oropharynx is clear and moist. No oropharyngeal exudate, posterior oropharyngeal edema or posterior oropharyngeal erythema.   Eyes: Conjunctivae, EOM and lids are normal. Pupils are equal, round, and reactive to light. Right eye exhibits no discharge and no exudate. Left eye exhibits no discharge and no exudate. Right conjunctiva is not injected. Left conjunctiva is not injected.   Neck: Trachea normal and full passive range of motion without pain. Normal carotid pulses, no hepatojugular reflux and no JVD present. Carotid bruit is not present. No neck rigidity. No edema and no erythema present. No thyroid mass and no thyromegaly present.   Cardiovascular: Normal rate, regular rhythm and normal heart sounds.   Pulmonary/Chest: Effort normal. No respiratory distress.   Abdominal: Soft. Normal appearance and bowel sounds are normal. There is no tenderness. There is negative Charlton's sign.   Lymphadenopathy:     He has no cervical adenopathy.   Neurological: He is alert.   Skin: Skin is warm and dry.   Psychiatric: He has a normal mood and affect. His speech is normal and behavior is normal.   Nursing note and vitals reviewed.      Assessment and Plan:   Amanuel ARCHIBALD was seen today for " sinusitis.    Diagnoses and all orders for this visit:    Upper respiratory tract infection, unspecified type    Other orders  -     methylPREDNISolone (MEDROL DOSEPACK) 4 mg tablet; use as directed    Hydrate, rest, OTC Mucinex Expectorant as directed, Nasal saline as needed.  OTC Zyrtec as directed.      Follow-up in about 2 weeks (around 1/10/2019).    THIS NOTE WILL BE SHARED WITH THE PATIENT.

## 2019-03-12 ENCOUNTER — OFFICE VISIT (OUTPATIENT)
Dept: URGENT CARE | Facility: CLINIC | Age: 29
End: 2019-03-12
Payer: COMMERCIAL

## 2019-03-12 VITALS
DIASTOLIC BLOOD PRESSURE: 67 MMHG | HEART RATE: 72 BPM | OXYGEN SATURATION: 99 % | SYSTOLIC BLOOD PRESSURE: 132 MMHG | TEMPERATURE: 97 F | HEIGHT: 65 IN | BODY MASS INDEX: 26.66 KG/M2 | RESPIRATION RATE: 19 BRPM | WEIGHT: 160 LBS

## 2019-03-12 DIAGNOSIS — L03.019 PARONYCHIA OF FINGER, UNSPECIFIED LATERALITY: Primary | ICD-10-CM

## 2019-03-12 PROCEDURE — 99214 PR OFFICE/OUTPT VISIT, EST, LEVL IV, 30-39 MIN: ICD-10-PCS | Mod: S$GLB,,, | Performed by: INTERNAL MEDICINE

## 2019-03-12 PROCEDURE — 99214 OFFICE O/P EST MOD 30 MIN: CPT | Mod: S$GLB,,, | Performed by: INTERNAL MEDICINE

## 2019-03-12 RX ORDER — SULFAMETHOXAZOLE AND TRIMETHOPRIM 800; 160 MG/1; MG/1
1 TABLET ORAL 2 TIMES DAILY
Qty: 14 TABLET | Refills: 0 | Status: SHIPPED | OUTPATIENT
Start: 2019-03-12 | End: 2019-03-22 | Stop reason: ALTCHOICE

## 2019-03-12 RX ORDER — MUPIROCIN 20 MG/G
OINTMENT TOPICAL
Qty: 22 G | Refills: 1 | Status: SHIPPED | OUTPATIENT
Start: 2019-03-12 | End: 2020-03-20

## 2019-03-12 NOTE — PROGRESS NOTES
"Subjective:       Patient ID: Amanuel Jimenez is a 29 y.o. male.    Vitals:  height is 5' 5" (1.651 m) and weight is 72.6 kg (160 lb). His oral temperature is 97.3 °F (36.3 °C). His blood pressure is 132/67 and his pulse is 72. His respiration is 19 and oxygen saturation is 99%.     Chief Complaint: No chief complaint on file.    No known injury mechanism. Patient complains of pain when pressure applied to left middle finger.      Nail Problem   This is a new problem. The current episode started in the past 7 days (6 days). The problem occurs constantly. The problem has been gradually worsening. Pertinent negatives include no abdominal pain, anorexia, arthralgias, change in bowel habit, chest pain, chills, congestion, coughing, diaphoresis, fatigue, fever, headaches, joint swelling, myalgias, nausea, neck pain, numbness, rash, sore throat, swollen glands, urinary symptoms, vertigo, visual change, vomiting or weakness. Associated symptoms comments: Redness and swelling by nail of left middle finger. Nothing aggravates the symptoms. Treatments tried: Neorporin. The treatment provided no relief.       Constitution: Negative for chills, sweating, fatigue and fever.   HENT: Negative for congestion and sore throat.    Neck: Negative for neck pain and painful lymph nodes.   Cardiovascular: Negative for chest pain and leg swelling.   Eyes: Negative for double vision and blurred vision.   Respiratory: Negative for cough and shortness of breath.    Gastrointestinal: Negative for abdominal pain, nausea, vomiting and diarrhea.   Genitourinary: Negative for dysuria, frequency and urgency.   Musculoskeletal: Negative for joint pain, joint swelling, muscle cramps and muscle ache.   Skin: Negative for color change, pale and rash.   Allergic/Immunologic: Negative for seasonal allergies.   Neurological: Negative for dizziness, history of vertigo, light-headedness, passing out, headaches and numbness.   Hematologic/Lymphatic: Negative " for swollen lymph nodes, easy bruising/bleeding and history of blood clots. Does not bruise/bleed easily.   Psychiatric/Behavioral: Negative for nervous/anxious, sleep disturbance and depression. The patient is not nervous/anxious.        Objective:      Physical Exam   Skin:   Paronychia middle R finger; cleaned with betadine and opened with 18 G needle;pus expressed       Assessment:       1. Paronychia of finger, unspecified laterality        Plan:         Paronychia of finger, unspecified laterality  -     mupirocin (BACTROBAN) 2 % ointment; Apply to affected area 2 times daily  Dispense: 22 g; Refill: 1  -     sulfamethoxazole-trimethoprim 800-160mg (BACTRIM DS) 800-160 mg Tab; Take 1 tablet by mouth 2 (two) times daily.  Dispense: 14 tablet; Refill: 0

## 2019-03-22 ENCOUNTER — OFFICE VISIT (OUTPATIENT)
Dept: INTERNAL MEDICINE | Facility: CLINIC | Age: 29
End: 2019-03-22
Payer: COMMERCIAL

## 2019-03-22 ENCOUNTER — TELEPHONE (OUTPATIENT)
Dept: INTERNAL MEDICINE | Facility: CLINIC | Age: 29
End: 2019-03-22

## 2019-03-22 VITALS
HEIGHT: 65 IN | DIASTOLIC BLOOD PRESSURE: 63 MMHG | SYSTOLIC BLOOD PRESSURE: 106 MMHG | BODY MASS INDEX: 28.95 KG/M2 | WEIGHT: 173.75 LBS | OXYGEN SATURATION: 98 % | HEART RATE: 77 BPM | TEMPERATURE: 98 F

## 2019-03-22 DIAGNOSIS — L03.019 PARONYCHIA OF FINGER, UNSPECIFIED LATERALITY: Primary | ICD-10-CM

## 2019-03-22 PROCEDURE — 99213 PR OFFICE/OUTPT VISIT, EST, LEVL III, 20-29 MIN: ICD-10-PCS | Mod: S$GLB,,, | Performed by: FAMILY MEDICINE

## 2019-03-22 PROCEDURE — 99999 PR PBB SHADOW E&M-EST. PATIENT-LVL III: ICD-10-PCS | Mod: PBBFAC,,, | Performed by: FAMILY MEDICINE

## 2019-03-22 PROCEDURE — 99213 OFFICE O/P EST LOW 20 MIN: CPT | Mod: S$GLB,,, | Performed by: FAMILY MEDICINE

## 2019-03-22 PROCEDURE — 99999 PR PBB SHADOW E&M-EST. PATIENT-LVL III: CPT | Mod: PBBFAC,,, | Performed by: FAMILY MEDICINE

## 2019-03-22 NOTE — TELEPHONE ENCOUNTER
----- Message from Daniel Mora MD sent at 3/22/2019  7:12 AM CDT -----  Pt apparently coming in again today for same problem as about 10 days ago. Please let him know I don't do I&D's and if the finger is worse then he must go to urgent care. Thank you

## 2019-03-22 NOTE — TELEPHONE ENCOUNTER
Pt called back and was advised of the blow listed,per . Pt stated that he didn't think that his finger needed to be drained again, but he feels he may need additional antibiotics because the finger hasn't really gotten better. I informed the pt that I would speak to , and give him a call back.pt verbalized understanding.

## 2019-03-22 NOTE — PROGRESS NOTES
"Subjective:   Patient ID: Amanuel Jimenez is a 29 y.o. male.    Chief Complaint: Follow-up (finger inflammation. middle finger of the left hand)      HPI  30 yo worried if paronychia is healing well.    Patient queried and denies any further complaints.      ALLERGIES AND MEDICATIONS: updated and reviewed.  Review of patient's allergies indicates:  No Known Allergies    Current Outpatient Medications:     albuterol (PROVENTIL/VENTOLIN HFA) 90 mcg/actuation inhaler, Inhale 2 puffs into the lungs every 6 (six) hours as needed for Wheezing. Rescue, Disp: 18 g, Rfl: 5    mupirocin (BACTROBAN) 2 % ointment, Apply to affected area 2 times daily, Disp: 22 g, Rfl: 1    Review of Systems   Skin: Positive for wound.       Objective:     Vitals:    03/22/19 1121   BP: 106/63   Pulse: 77   Temp: 98.1 °F (36.7 °C)   TempSrc: Oral   SpO2: 98%   Weight: 78.8 kg (173 lb 11.6 oz)   Height: 5' 5" (1.651 m)   PainSc:   3   PainLoc: Finger     Body mass index is 28.91 kg/m².    Physical Exam   Skin: Skin is intact.   Nursing note and vitals reviewed.      Assessment and Plan:   Amanuel ARCHIBALD was seen today for follow-up.    Diagnoses and all orders for this visit:    Paronychia of finger, unspecified laterality    resolving   reassurance    Follow-up in about 2 weeks (around 4/5/2019), or if symptoms worsen or fail to improve.    THIS NOTE WILL BE SHARED WITH THE PATIENT.        "

## 2019-04-16 ENCOUNTER — OFFICE VISIT (OUTPATIENT)
Dept: OTOLARYNGOLOGY | Facility: CLINIC | Age: 29
End: 2019-04-16
Payer: COMMERCIAL

## 2019-04-16 VITALS
BODY MASS INDEX: 28.51 KG/M2 | WEIGHT: 171.31 LBS | TEMPERATURE: 98 F | DIASTOLIC BLOOD PRESSURE: 80 MMHG | SYSTOLIC BLOOD PRESSURE: 126 MMHG | HEART RATE: 70 BPM

## 2019-04-16 DIAGNOSIS — J30.9 ALLERGIC RHINITIS, UNSPECIFIED SEASONALITY, UNSPECIFIED TRIGGER: Primary | ICD-10-CM

## 2019-04-16 PROCEDURE — 99203 PR OFFICE/OUTPT VISIT, NEW, LEVL III, 30-44 MIN: ICD-10-PCS | Mod: S$GLB,,, | Performed by: OTOLARYNGOLOGY

## 2019-04-16 PROCEDURE — 99999 PR PBB SHADOW E&M-EST. PATIENT-LVL III: CPT | Mod: PBBFAC,,, | Performed by: OTOLARYNGOLOGY

## 2019-04-16 PROCEDURE — 99999 PR PBB SHADOW E&M-EST. PATIENT-LVL III: ICD-10-PCS | Mod: PBBFAC,,, | Performed by: OTOLARYNGOLOGY

## 2019-04-16 PROCEDURE — 99203 OFFICE O/P NEW LOW 30 MIN: CPT | Mod: S$GLB,,, | Performed by: OTOLARYNGOLOGY

## 2019-04-16 RX ORDER — FLUTICASONE PROPIONATE 50 MCG
2 SPRAY, SUSPENSION (ML) NASAL DAILY
Qty: 2 BOTTLE | Refills: 3 | Status: SHIPPED | OUTPATIENT
Start: 2019-04-16 | End: 2019-06-19

## 2019-04-16 NOTE — PROGRESS NOTES
Chief Complaint   Patient presents with    Consult     possible sinus infection/earache in both ears painful         29 y.o. male presents with increased nasal congestion and rhinorrhea. No nasal pain, facial pain or pressure. Also with some associated ear pain. No otorrhea or decreased hearing. Not using nasal sprays. Has used Allegra and Zyrtec in the past.      Review of Systems     Constitutional: Negative for fatigue and unexpected weight change.   HENT: per HPI.  Eyes: Negative for visual disturbance.   Respiratory: Negative for shortness of breath, hemoptysis  Cardiovascular: Negative for chest pain and palpitations.   Genitourinary: Negative for dysuria and difficulty urinating.   Musculoskeletal: Negative for decreased ROM, back pain.   Skin: Negative for rash, sunburn, itching.   Neurological: Negative for dizziness and seizures.   Hematological: Negative for adenopathy. Does not bruise/bleed easily.   Psychiatric/Behavioral: Negative for agitation. The patient is not nervous/anxious.   Endocrine: Negative for rapid weight loss/weight gain, heat/cold intolerance.     Past Medical History:   Diagnosis Date    Rectal fistula        Past Surgical History:   Procedure Laterality Date    COLONOSCOPY N/A 5/26/2017    Performed [facility] Galen Weathers [facility] at [facility] ENDO    [facility]      EXAM UNDER ANESTHESIA rectal N/A 5/15/2017    Performed by Kassidy Sharif [facility] at [facility] OR    INCISION AND DRAINAGE (I&D), ABSCESS, rectal N/A 5/15/2017    Performed by [facility]idy Sharif [facility] at [facility] OR    PLACEMENT-SETON DRAIN N/A 5/15/2017    Performed by [facility]idy Sharif [facility] at [facility] OR    rectal fistula repair         family history includes Cancer in his father.    Pt  reports that he quit smoking about 2 months ago. His smoking use included cigarettes. He smoked 0.50 packs per day. He has never used smokeless tobacco. He reports that he drinks alcohol.    Review of patient's allergies indicates:  No Known Allergies     Physical  Exam    Vitals:    04/16/19 0908   BP: 126/80   Pulse: 70   Temp: 98.2 °F (36.8 °C)     Body mass index is 28.51 kg/m².  Physical Exam   Constitutional: He is oriented to person, place, and time. He appears well-developed and well-nourished. No distress.   HENT:   Head: Normocephalic and atraumatic.   Right Ear: Hearing, tympanic membrane, external ear and ear canal normal. Tympanic membrane mobility is normal. No middle ear effusion. No decreased hearing is noted.   Left Ear: Hearing, tympanic membrane, external ear and ear canal normal. Tympanic membrane mobility is normal.  No middle ear effusion. No decreased hearing is noted.   Nose: Mucosal edema and rhinorrhea present. Right sinus exhibits no maxillary sinus tenderness and no frontal sinus tenderness. Left sinus exhibits no maxillary sinus tenderness and no frontal sinus tenderness.   Mouth/Throat: Uvula is midline, oropharynx is clear and moist and mucous membranes are normal.   Bilateral nasal cavities inspected: no polyps or purulent fluid seen.   Eyes: Pupils are equal, round, and reactive to light. Conjunctivae, EOM and lids are normal. Right eye exhibits no discharge. Left eye exhibits no discharge.   Neck: Trachea normal, normal range of motion and phonation normal. Neck supple. No tracheal tenderness present. No tracheal deviation, no edema and no erythema present. No thyroid mass and no thyromegaly present.   Cardiovascular: Normal heart sounds.   Pulmonary/Chest: Breath sounds normal. No stridor.   Abdominal: Soft.   Lymphadenopathy:     He has no cervical adenopathy.   Neurological: He is alert and oriented to person, place, and time.   Skin: Skin is warm and dry. No rash noted. He is not diaphoretic. No erythema. No pallor.   Psychiatric: He has a normal mood and affect.   Nursing note and vitals reviewed.          Assessment     1. Allergic rhinitis, unspecified seasonality, unspecified trigger          Plan  In summary,  Mr. Villa is a 29  year old male with signs and symptoms of Allergic Rhinitis. Recommend consistent nasal regimen with Flonase and nasal saline as well as antihistamine as needed. May need allergy testing if symptoms do not improve. All questions were answered. RTC prn.

## 2019-04-24 RX ORDER — ALBUTEROL SULFATE 90 UG/1
2 AEROSOL, METERED RESPIRATORY (INHALATION) EVERY 6 HOURS PRN
Qty: 18 G | Refills: 5 | Status: SHIPPED | OUTPATIENT
Start: 2019-04-24 | End: 2019-12-30 | Stop reason: SDUPTHER

## 2019-06-19 ENCOUNTER — OFFICE VISIT (OUTPATIENT)
Dept: INTERNAL MEDICINE | Facility: CLINIC | Age: 29
End: 2019-06-19
Payer: COMMERCIAL

## 2019-06-19 VITALS
WEIGHT: 176.56 LBS | BODY MASS INDEX: 29.42 KG/M2 | DIASTOLIC BLOOD PRESSURE: 62 MMHG | TEMPERATURE: 99 F | HEART RATE: 79 BPM | RESPIRATION RATE: 18 BRPM | HEIGHT: 65 IN | SYSTOLIC BLOOD PRESSURE: 120 MMHG

## 2019-06-19 DIAGNOSIS — J32.9 VIRAL SINUSITIS: Primary | ICD-10-CM

## 2019-06-19 DIAGNOSIS — B97.89 VIRAL SINUSITIS: Primary | ICD-10-CM

## 2019-06-19 DIAGNOSIS — R51.9 SINUS HEADACHE: ICD-10-CM

## 2019-06-19 DIAGNOSIS — J20.9 ACUTE BRONCHITIS, UNSPECIFIED ORGANISM: ICD-10-CM

## 2019-06-19 PROCEDURE — 99214 OFFICE O/P EST MOD 30 MIN: CPT | Mod: 25,S$GLB,, | Performed by: INTERNAL MEDICINE

## 2019-06-19 PROCEDURE — 96372 PR INJECTION,THERAP/PROPH/DIAG2ST, IM OR SUBCUT: ICD-10-PCS | Mod: S$GLB,,, | Performed by: INTERNAL MEDICINE

## 2019-06-19 PROCEDURE — 99999 PR PBB SHADOW E&M-EST. PATIENT-LVL III: CPT | Mod: PBBFAC,,, | Performed by: INTERNAL MEDICINE

## 2019-06-19 PROCEDURE — 99214 PR OFFICE/OUTPT VISIT, EST, LEVL IV, 30-39 MIN: ICD-10-PCS | Mod: 25,S$GLB,, | Performed by: INTERNAL MEDICINE

## 2019-06-19 PROCEDURE — 99999 PR PBB SHADOW E&M-EST. PATIENT-LVL III: ICD-10-PCS | Mod: PBBFAC,,, | Performed by: INTERNAL MEDICINE

## 2019-06-19 PROCEDURE — 96372 THER/PROPH/DIAG INJ SC/IM: CPT | Mod: S$GLB,,, | Performed by: INTERNAL MEDICINE

## 2019-06-19 RX ORDER — TRIAMCINOLONE ACETONIDE 40 MG/ML
40 INJECTION, SUSPENSION INTRA-ARTICULAR; INTRAMUSCULAR ONCE
Status: COMPLETED | OUTPATIENT
Start: 2019-06-19 | End: 2019-06-19

## 2019-06-19 RX ORDER — GUAIFENESIN 600 MG/1
1200 TABLET, EXTENDED RELEASE ORAL 2 TIMES DAILY
COMMUNITY

## 2019-06-19 RX ORDER — FLUTICASONE PROPIONATE 50 MCG
2 SPRAY, SUSPENSION (ML) NASAL DAILY
Qty: 16 G | Refills: 12 | Status: SHIPPED | OUTPATIENT
Start: 2019-06-19 | End: 2019-07-19

## 2019-06-19 RX ORDER — CODEINE PHOSPHATE AND GUAIFENESIN 10; 100 MG/5ML; MG/5ML
5 SOLUTION ORAL 3 TIMES DAILY PRN
Qty: 236 ML | Refills: 0 | Status: SHIPPED | OUTPATIENT
Start: 2019-06-19 | End: 2019-06-29

## 2019-06-19 RX ADMIN — TRIAMCINOLONE ACETONIDE 40 MG: 40 INJECTION, SUSPENSION INTRA-ARTICULAR; INTRAMUSCULAR at 03:06

## 2019-06-19 NOTE — PROGRESS NOTES
Subjective:       Patient ID: Amanuel Jimenez is a 29 y.o. male.    Chief Complaint: Cough (green, black brown mucus) and Sore Throat    HPI   Pt c/o 5 days of worsening sinus/chest congestion, productive cough, post nasal drip, sore throat, subjective fevers, wheezing/SOB and frontal headache. Minimal relief with Claritin, Mucinex.   Review of Systems   Constitutional: Negative for activity change, appetite change, chills, diaphoresis, fatigue, fever and unexpected weight change.   HENT: Positive for congestion, postnasal drip, rhinorrhea, sinus pressure, sinus pain and sore throat. Negative for sneezing, trouble swallowing and voice change.    Respiratory: Positive for cough, shortness of breath and wheezing.    Cardiovascular: Negative for chest pain, palpitations and leg swelling.   Gastrointestinal: Negative for abdominal pain, blood in stool, constipation, diarrhea, nausea and vomiting.   Genitourinary: Negative for dysuria.   Musculoskeletal: Negative for arthralgias and myalgias.   Skin: Negative for rash and wound.   Allergic/Immunologic: Negative for environmental allergies and food allergies.   Neurological: Positive for headaches.   Hematological: Negative for adenopathy. Does not bruise/bleed easily.       Objective:      Physical Exam   Constitutional: He is oriented to person, place, and time. He appears well-developed and well-nourished. No distress.   HENT:   Head: Normocephalic and atraumatic.   Right Ear: External ear normal.   Left Ear: External ear normal.   Nose: Mucosal edema and rhinorrhea present.   Mouth/Throat: Oropharynx is clear and moist. No oropharyngeal exudate.   Eyes: Pupils are equal, round, and reactive to light. Conjunctivae and EOM are normal. Right eye exhibits no discharge. Left eye exhibits no discharge. No scleral icterus.   Neck: Normal range of motion. Neck supple. No JVD present.   Cardiovascular: Normal rate, regular rhythm and normal heart sounds.   No murmur  heard.  Pulmonary/Chest: Effort normal and breath sounds normal. No respiratory distress. He has no wheezes. He has no rales.   Musculoskeletal: He exhibits no edema.   Lymphadenopathy:     He has no cervical adenopathy.   Neurological: He is alert and oriented to person, place, and time.   Skin: Skin is warm and dry. No rash noted. He is not diaphoretic. No pallor.       Assessment:       1. Viral sinusitis    2. Acute bronchitis, unspecified organism    3. Sinus headache        Plan:    1. Rx Flonase, Kenalog 40 mg IM, continue Claritin   2. Rx Cheratussin AC TID PRN   3. NSAIDs PRN

## 2019-12-30 ENCOUNTER — OFFICE VISIT (OUTPATIENT)
Dept: INTERNAL MEDICINE | Facility: CLINIC | Age: 29
End: 2019-12-30
Payer: COMMERCIAL

## 2019-12-30 VITALS
SYSTOLIC BLOOD PRESSURE: 122 MMHG | HEART RATE: 77 BPM | HEIGHT: 65 IN | RESPIRATION RATE: 16 BRPM | WEIGHT: 178.13 LBS | TEMPERATURE: 99 F | DIASTOLIC BLOOD PRESSURE: 62 MMHG | BODY MASS INDEX: 29.68 KG/M2

## 2019-12-30 DIAGNOSIS — J20.8 VIRAL BRONCHITIS: Primary | ICD-10-CM

## 2019-12-30 PROCEDURE — 96372 PR INJECTION,THERAP/PROPH/DIAG2ST, IM OR SUBCUT: ICD-10-PCS | Mod: S$GLB,,, | Performed by: INTERNAL MEDICINE

## 2019-12-30 PROCEDURE — 99214 OFFICE O/P EST MOD 30 MIN: CPT | Mod: 25,S$GLB,, | Performed by: INTERNAL MEDICINE

## 2019-12-30 PROCEDURE — 96372 THER/PROPH/DIAG INJ SC/IM: CPT | Mod: S$GLB,,, | Performed by: INTERNAL MEDICINE

## 2019-12-30 PROCEDURE — 99214 PR OFFICE/OUTPT VISIT, EST, LEVL IV, 30-39 MIN: ICD-10-PCS | Mod: 25,S$GLB,, | Performed by: INTERNAL MEDICINE

## 2019-12-30 PROCEDURE — 99999 PR PBB SHADOW E&M-EST. PATIENT-LVL III: CPT | Mod: PBBFAC,,, | Performed by: INTERNAL MEDICINE

## 2019-12-30 PROCEDURE — 99999 PR PBB SHADOW E&M-EST. PATIENT-LVL III: ICD-10-PCS | Mod: PBBFAC,,, | Performed by: INTERNAL MEDICINE

## 2019-12-30 RX ORDER — PROMETHAZINE HYDROCHLORIDE AND CODEINE PHOSPHATE 6.25; 1 MG/5ML; MG/5ML
5 SOLUTION ORAL EVERY 4 HOURS PRN
Qty: 118 ML | Refills: 0 | Status: SHIPPED | OUTPATIENT
Start: 2019-12-30 | End: 2020-01-06

## 2019-12-30 RX ORDER — ALBUTEROL SULFATE 90 UG/1
1-2 AEROSOL, METERED RESPIRATORY (INHALATION) EVERY 4 HOURS PRN
Qty: 18 G | Refills: 2 | Status: SHIPPED | OUTPATIENT
Start: 2019-12-30 | End: 2021-03-10 | Stop reason: SDUPTHER

## 2019-12-30 RX ORDER — TRIAMCINOLONE ACETONIDE 40 MG/ML
40 INJECTION, SUSPENSION INTRA-ARTICULAR; INTRAMUSCULAR
Status: COMPLETED | OUTPATIENT
Start: 2019-12-30 | End: 2019-12-30

## 2019-12-30 RX ADMIN — TRIAMCINOLONE ACETONIDE 40 MG: 40 INJECTION, SUSPENSION INTRA-ARTICULAR; INTRAMUSCULAR at 03:12

## 2019-12-30 NOTE — PROGRESS NOTES
"Subjective:       Patient ID: Amanuel Jimenez is a 29 y.o. male.    Chief Complaint: Bronchitis    HPI    He presents for evaluation of bronchitis symptoms. He reports purulent sputum that started several weeks ago. He had significant worsening on isaías janak so has been trying to rest since then. Associated symptoms including fatigue, dizziness, ear pain/popping. He thinks he has had fevers. Rhinorrhea started today.   He is taking allegra, ocean spray, ibuprofen. He ran out of his albuterol inhaler. He has tried otc cough syrup w/o relief.     Review of Systems   Constitutional: Positive for chills. Negative for activity change and fever.   HENT: Positive for congestion, ear pain, rhinorrhea and sinus pressure.    Eyes: Negative for discharge.   Respiratory: Positive for cough and shortness of breath. Negative for chest tightness.    Cardiovascular: Negative for chest pain.   Musculoskeletal: Negative for arthralgias and myalgias.   Neurological: Positive for dizziness. Negative for weakness.       Objective:        Vitals:    12/30/19 1524   BP: 122/62   Pulse: 77   Resp: 16   Temp: 98.8 °F (37.1 °C)   TempSrc: Oral   Weight: 80.8 kg (178 lb 2.1 oz)   Height: 5' 5" (1.651 m)       Body mass index is 29.64 kg/m².    Physical Exam   Constitutional: He appears well-developed and well-nourished.   HENT:   Head: Normocephalic and atraumatic.   Right Ear: External ear normal. Tympanic membrane is erythematous.   Left Ear: External ear normal. Tympanic membrane is erythematous.   Mouth/Throat: Posterior oropharyngeal erythema present. No oropharyngeal exudate.   Eyes: Conjunctivae and EOM are normal.   Cardiovascular: Normal rate, regular rhythm, normal heart sounds and intact distal pulses.   Pulmonary/Chest: Effort normal. No accessory muscle usage or stridor. No tachypnea. No respiratory distress. He has decreased breath sounds (mildly, throughout). He has no wheezes. He has no rhonchi. He has no rales. "   Lymphadenopathy:     He has no cervical adenopathy.   Skin: Skin is warm and dry.   Psychiatric: He has a normal mood and affect.       Assessment:     1. Viral bronchitis           Plan:         1. Viral bronchitis  - triamcinolone acetonide injection 40 mg  - albuterol (PROVENTIL/VENTOLIN HFA) 90 mcg/actuation inhaler; Inhale 1-2 puffs into the lungs every 4 (four) hours as needed for Wheezing or Shortness of Breath. Rescue  Dispense: 18 g; Refill: 2  - promethazine-codeine 6.25-10 mg/5 ml (PHENERGAN WITH CODEINE) 6.25-10 mg/5 mL syrup; Take 5 mLs by mouth every 4 (four) hours as needed for Cough (cough at night).  Dispense: 118 mL; Refill: 0- Caution this medication is sedating.  Do not operate heavy machinery while taking this medication.  - he denies need for work excuse        Patient note was created using MModal Dictation.  Any errors in syntax or even information may not have been identified and edited on initial review prior to signing this note.

## 2020-03-18 ENCOUNTER — LAB VISIT (OUTPATIENT)
Dept: LAB | Facility: HOSPITAL | Age: 30
End: 2020-03-18
Attending: FAMILY MEDICINE
Payer: COMMERCIAL

## 2020-03-18 ENCOUNTER — OFFICE VISIT (OUTPATIENT)
Dept: PRIMARY CARE CLINIC | Facility: CLINIC | Age: 30
End: 2020-03-18
Payer: COMMERCIAL

## 2020-03-18 VITALS
HEART RATE: 72 BPM | SYSTOLIC BLOOD PRESSURE: 122 MMHG | BODY MASS INDEX: 27.89 KG/M2 | HEIGHT: 67 IN | WEIGHT: 177.69 LBS | DIASTOLIC BLOOD PRESSURE: 84 MMHG | TEMPERATURE: 99 F

## 2020-03-18 DIAGNOSIS — J45.40 MODERATE PERSISTENT ASTHMA WITHOUT COMPLICATION: ICD-10-CM

## 2020-03-18 DIAGNOSIS — Z87.891 HISTORY OF SMOKING: ICD-10-CM

## 2020-03-18 DIAGNOSIS — Z00.00 GENERAL MEDICAL EXAM: ICD-10-CM

## 2020-03-18 DIAGNOSIS — Z00.00 GENERAL MEDICAL EXAM: Primary | ICD-10-CM

## 2020-03-18 DIAGNOSIS — J30.9 CHRONIC ALLERGIC RHINITIS: ICD-10-CM

## 2020-03-18 DIAGNOSIS — H92.09 OTALGIA, UNSPECIFIED LATERALITY: ICD-10-CM

## 2020-03-18 LAB
ALBUMIN SERPL BCP-MCNC: 4.3 G/DL (ref 3.5–5.2)
ALP SERPL-CCNC: 78 U/L (ref 55–135)
ALT SERPL W/O P-5'-P-CCNC: 27 U/L (ref 10–44)
ANION GAP SERPL CALC-SCNC: 7 MMOL/L (ref 8–16)
AST SERPL-CCNC: 21 U/L (ref 10–40)
BASOPHILS # BLD AUTO: 0.05 K/UL (ref 0–0.2)
BASOPHILS NFR BLD: 0.7 % (ref 0–1.9)
BILIRUB SERPL-MCNC: 0.4 MG/DL (ref 0.1–1)
BUN SERPL-MCNC: 11 MG/DL (ref 6–20)
CALCIUM SERPL-MCNC: 9.4 MG/DL (ref 8.7–10.5)
CHLORIDE SERPL-SCNC: 108 MMOL/L (ref 95–110)
CHOLEST SERPL-MCNC: 239 MG/DL (ref 120–199)
CHOLEST/HDLC SERPL: 6.6 {RATIO} (ref 2–5)
CO2 SERPL-SCNC: 25 MMOL/L (ref 23–29)
CREAT SERPL-MCNC: 0.9 MG/DL (ref 0.5–1.4)
DIFFERENTIAL METHOD: ABNORMAL
EOSINOPHIL # BLD AUTO: 0.3 K/UL (ref 0–0.5)
EOSINOPHIL NFR BLD: 4.8 % (ref 0–8)
ERYTHROCYTE [DISTWIDTH] IN BLOOD BY AUTOMATED COUNT: 13 % (ref 11.5–14.5)
EST. GFR  (AFRICAN AMERICAN): >60 ML/MIN/1.73 M^2
EST. GFR  (NON AFRICAN AMERICAN): >60 ML/MIN/1.73 M^2
ESTIMATED AVG GLUCOSE: 103 MG/DL (ref 68–131)
GLUCOSE SERPL-MCNC: 94 MG/DL (ref 70–110)
HBA1C MFR BLD HPLC: 5.2 % (ref 4–5.6)
HCT VFR BLD AUTO: 47.5 % (ref 40–54)
HDLC SERPL-MCNC: 36 MG/DL (ref 40–75)
HDLC SERPL: 15.1 % (ref 20–50)
HGB BLD-MCNC: 15.4 G/DL (ref 14–18)
IMM GRANULOCYTES # BLD AUTO: 0.04 K/UL (ref 0–0.04)
IMM GRANULOCYTES NFR BLD AUTO: 0.6 % (ref 0–0.5)
LDLC SERPL CALC-MCNC: 176.2 MG/DL (ref 63–159)
LYMPHOCYTES # BLD AUTO: 2.4 K/UL (ref 1–4.8)
LYMPHOCYTES NFR BLD: 35 % (ref 18–48)
MCH RBC QN AUTO: 28.4 PG (ref 27–31)
MCHC RBC AUTO-ENTMCNC: 32.4 G/DL (ref 32–36)
MCV RBC AUTO: 88 FL (ref 82–98)
MONOCYTES # BLD AUTO: 0.4 K/UL (ref 0.3–1)
MONOCYTES NFR BLD: 5.7 % (ref 4–15)
NEUTROPHILS # BLD AUTO: 3.6 K/UL (ref 1.8–7.7)
NEUTROPHILS NFR BLD: 53.2 % (ref 38–73)
NONHDLC SERPL-MCNC: 203 MG/DL
NRBC BLD-RTO: 0 /100 WBC
PLATELET # BLD AUTO: 235 K/UL (ref 150–350)
PMV BLD AUTO: 11.5 FL (ref 9.2–12.9)
POTASSIUM SERPL-SCNC: 4.4 MMOL/L (ref 3.5–5.1)
PROT SERPL-MCNC: 7.3 G/DL (ref 6–8.4)
RBC # BLD AUTO: 5.42 M/UL (ref 4.6–6.2)
SODIUM SERPL-SCNC: 140 MMOL/L (ref 136–145)
T4 FREE SERPL-MCNC: 0.94 NG/DL (ref 0.71–1.51)
TRIGL SERPL-MCNC: 134 MG/DL (ref 30–150)
TSH SERPL DL<=0.005 MIU/L-ACNC: 0.57 UIU/ML (ref 0.4–4)
WBC # BLD AUTO: 6.82 K/UL (ref 3.9–12.7)

## 2020-03-18 PROCEDURE — 36415 COLL VENOUS BLD VENIPUNCTURE: CPT | Mod: PN

## 2020-03-18 PROCEDURE — 84439 ASSAY OF FREE THYROXINE: CPT

## 2020-03-18 PROCEDURE — 85025 COMPLETE CBC W/AUTO DIFF WBC: CPT

## 2020-03-18 PROCEDURE — 80053 COMPREHEN METABOLIC PANEL: CPT

## 2020-03-18 PROCEDURE — 99999 PR PBB SHADOW E&M-EST. PATIENT-LVL III: CPT | Mod: PBBFAC,,, | Performed by: FAMILY MEDICINE

## 2020-03-18 PROCEDURE — 83036 HEMOGLOBIN GLYCOSYLATED A1C: CPT

## 2020-03-18 PROCEDURE — 99395 PREV VISIT EST AGE 18-39: CPT | Mod: S$GLB,,, | Performed by: FAMILY MEDICINE

## 2020-03-18 PROCEDURE — 99999 PR PBB SHADOW E&M-EST. PATIENT-LVL III: ICD-10-PCS | Mod: PBBFAC,,, | Performed by: FAMILY MEDICINE

## 2020-03-18 PROCEDURE — 80061 LIPID PANEL: CPT

## 2020-03-18 PROCEDURE — 99395 PR PREVENTIVE VISIT,EST,18-39: ICD-10-PCS | Mod: S$GLB,,, | Performed by: FAMILY MEDICINE

## 2020-03-18 PROCEDURE — 84443 ASSAY THYROID STIM HORMONE: CPT

## 2020-03-18 NOTE — PROGRESS NOTES
Subjective:   Patient ID: Amanuel Jimenez is a 30 y.o. male.    Chief Complaint:  Annual exam      HPI  30-year-old male here for physical    Has some fatigue issues    Health screenings discussed    ALLERGIES AND MEDICATIONS: updated and reviewed.  Review of patient's allergies indicates:  No Known Allergies    Current Outpatient Medications:     albuterol (PROVENTIL/VENTOLIN HFA) 90 mcg/actuation inhaler, Inhale 1-2 puffs into the lungs every 4 (four) hours as needed for Wheezing or Shortness of Breath. Rescue, Disp: 18 g, Rfl: 2    guaiFENesin (MUCINEX) 600 mg 12 hr tablet, Take 1,200 mg by mouth 2 (two) times daily., Disp: , Rfl:     loratadine (CLARITIN) 5 mg chewable tablet, Take 5 mg by mouth once daily., Disp: , Rfl:     sodium chloride (SALINE NASAL) 0.65 % nasal spray, 1 spray by Nasal route as needed for Congestion., Disp: , Rfl:     Review of Systems   Constitutional: Positive for fatigue. Negative for activity change, appetite change, chills, diaphoresis, fever and unexpected weight change.   HENT: Negative for congestion, ear discharge, ear pain, facial swelling, hearing loss, nosebleeds, postnasal drip, rhinorrhea, sinus pressure, sneezing, sore throat, tinnitus, trouble swallowing and voice change.    Eyes: Negative for photophobia, pain, discharge, redness, itching and visual disturbance.   Respiratory: Negative for cough, chest tightness, shortness of breath and wheezing.    Cardiovascular: Negative for chest pain, palpitations and leg swelling.   Gastrointestinal: Negative for abdominal distention, abdominal pain, anal bleeding, blood in stool, constipation, diarrhea, nausea, rectal pain and vomiting.   Endocrine: Negative for cold intolerance, heat intolerance, polydipsia, polyphagia and polyuria.   Genitourinary: Negative for difficulty urinating, dysuria and flank pain.   Musculoskeletal: Negative for arthralgias, back pain, joint swelling, myalgias and neck pain.   Skin: Negative for rash.  "  Neurological: Negative for dizziness, tremors, seizures, syncope, speech difficulty, weakness, light-headedness, numbness and headaches.   Psychiatric/Behavioral: Negative for behavioral problems, confusion, decreased concentration, dysphoric mood, sleep disturbance and suicidal ideas. The patient is not nervous/anxious and is not hyperactive.        Objective:     Vitals:    03/18/20 0815   BP: 122/84   Pulse: 72   Temp: 98.7 °F (37.1 °C)   Weight: 80.6 kg (177 lb 11.1 oz)   Height: 5' 7" (1.702 m)     Body mass index is 27.83 kg/m².    Physical Exam   Constitutional: He is oriented to person, place, and time. He appears well-developed and well-nourished. He is cooperative. He does not have a sickly appearance. No distress.   HENT:   Head: Normocephalic and atraumatic.   Right Ear: Hearing, tympanic membrane, external ear and ear canal normal. No tenderness.   Left Ear: Hearing, tympanic membrane, external ear and ear canal normal. No tenderness.   Nose: Nose normal.   Mouth/Throat: Oropharynx is clear and moist.   Eyes: Pupils are equal, round, and reactive to light. Conjunctivae and lids are normal. Right eye exhibits no discharge. Left eye exhibits no discharge. Right conjunctiva is not injected. Left conjunctiva is not injected. No scleral icterus. Right eye exhibits normal extraocular motion. Left eye exhibits normal extraocular motion.   Neck: Normal range of motion. Neck supple. No JVD present. Carotid bruit is not present. No tracheal deviation and no edema present. No thyromegaly present.   Cardiovascular: Normal rate, regular rhythm, normal heart sounds and normal pulses. Exam reveals no friction rub.   No murmur heard.  Pulmonary/Chest: Effort normal and breath sounds normal. No accessory muscle usage. No respiratory distress. He has no wheezes. He has no rhonchi. He has no rales.   Abdominal: Soft. Bowel sounds are normal. He exhibits no distension, no abdominal bruit, no pulsatile midline mass and no " mass. There is no hepatosplenomegaly. There is no tenderness. There is no rebound, no guarding, no CVA tenderness, no tenderness at McBurney's point and negative Charlton's sign.   Musculoskeletal: He exhibits no edema.   Lymphadenopathy:        Head (right side): No submandibular, no preauricular and no posterior auricular adenopathy present.        Head (left side): No submandibular, no preauricular and no posterior auricular adenopathy present.     He has no cervical adenopathy.   Neurological: He is alert and oriented to person, place, and time. GCS eye subscore is 4. GCS verbal subscore is 5. GCS motor subscore is 6.   Skin: Skin is warm and dry. No ecchymosis and no rash noted. Rash is not maculopapular and not urticarial. He is not diaphoretic. No cyanosis or erythema. Nails show no clubbing.   Psychiatric: He has a normal mood and affect. His speech is normal and behavior is normal. Thought content normal. His mood appears not anxious. His affect is not angry and not inappropriate. He does not exhibit a depressed mood.   Nursing note and vitals reviewed.      Assessment and Plan:   Amanuel ARCHIBALD was seen today for bronchitis, fatigue, wants diabetes testing and dizziness.    Diagnoses and all orders for this visit:    General medical exam  -     CBC auto differential; Future  -     Comprehensive metabolic panel; Future  -     Hemoglobin A1c; Future  -     Lipid panel; Future  -     TSH; Future  -     T4, free; Future    Otalgia, unspecified laterality  -     Ambulatory referral/consult to ENT; Future    Moderate persistent asthma without complication    History of smoking    Chronic allergic rhinitis        No follow-ups on file.    THIS NOTE WILL BE SHARED WITH THE PATIENT.

## 2020-03-20 PROBLEM — J30.9 CHRONIC ALLERGIC RHINITIS: Status: ACTIVE | Noted: 2020-03-20

## 2020-04-07 ENCOUNTER — TELEPHONE (OUTPATIENT)
Dept: OTOLARYNGOLOGY | Facility: CLINIC | Age: 30
End: 2020-04-07

## 2020-04-17 ENCOUNTER — PATIENT OUTREACH (OUTPATIENT)
Dept: ADMINISTRATIVE | Facility: OTHER | Age: 30
End: 2020-04-17

## 2020-04-20 ENCOUNTER — OFFICE VISIT (OUTPATIENT)
Dept: OTOLARYNGOLOGY | Facility: CLINIC | Age: 30
End: 2020-04-20
Payer: COMMERCIAL

## 2020-04-20 DIAGNOSIS — H69.91 DYSFUNCTION OF RIGHT EUSTACHIAN TUBE: ICD-10-CM

## 2020-04-20 DIAGNOSIS — H92.03 OTALGIA OF BOTH EARS: ICD-10-CM

## 2020-04-20 DIAGNOSIS — J30.89 NON-SEASONAL ALLERGIC RHINITIS, UNSPECIFIED TRIGGER: Primary | ICD-10-CM

## 2020-04-20 PROCEDURE — 99213 PR OFFICE/OUTPT VISIT, EST, LEVL III, 20-29 MIN: ICD-10-PCS | Mod: 95,,, | Performed by: OTOLARYNGOLOGY

## 2020-04-20 PROCEDURE — 99213 OFFICE O/P EST LOW 20 MIN: CPT | Mod: 95,,, | Performed by: OTOLARYNGOLOGY

## 2020-04-20 RX ORDER — AMOXICILLIN 500 MG/1
500 TABLET, FILM COATED ORAL 2 TIMES DAILY
Qty: 20 TABLET | Refills: 0 | Status: SHIPPED | OUTPATIENT
Start: 2020-04-20 | End: 2020-04-30

## 2020-04-20 RX ORDER — PREDNISONE 5 MG/1
TABLET ORAL
Qty: 25 TABLET | Refills: 0 | Status: SHIPPED | OUTPATIENT
Start: 2020-04-20 | End: 2020-05-20

## 2020-04-20 NOTE — PROGRESS NOTES
Subjective:       Patient ID: Amanuel Jimenez is a 30 y.o. male.    Chief Complaint: Pt with Hx of B otal.    Video visit from Pt's work site.    Saw PCP 1 mo ago.  Ears nl then but C/O otal.    Saw Dr Morrell 1 yr ago with AR sx's/ exam with otal. Gets otal with blocked ears R>L.    R ear feels a bit blocked now.  No DC. Otal come and goes.    Rx'd with OTC Meds/ help but stopped.    Hx asthma, no ASA all.  ? Hx R tm perf.    Hx BMT as child.    Past Medical History: Patient has a past medical history of Rectal fistula.    Past Surgical History: Patient has a past surgical history that includes [facility]; Colonoscopy (N/A, 5/26/2017); and rectal fistula repair.    Social History: Patient reports that he quit smoking about 14 months ago. His smoking use included cigarettes. He smoked 0.50 packs per day. He has never used smokeless tobacco. He reports that he drinks alcohol.    Family History: family history includes Cancer in his father.    Medications:   Current Outpatient Medications   Medication Sig    albuterol (PROVENTIL/VENTOLIN HFA) 90 mcg/actuation inhaler Inhale 1-2 puffs into the lungs every 4 (four) hours as needed for Wheezing or Shortness of Breath. Rescue    amoxicillin (AMOXIL) 500 MG Tab Take 1 tablet (500 mg total) by mouth 2 (two) times daily. for 10 days    guaiFENesin (MUCINEX) 600 mg 12 hr tablet Take 1,200 mg by mouth 2 (two) times daily.    loratadine (CLARITIN) 5 mg chewable tablet Take 5 mg by mouth once daily.    predniSONE (DELTASONE) 5 MG tablet Take 6 the first day, then 5 the second, 4 the third, then 3, 2 and one.    sodium chloride (SALINE NASAL) 0.65 % nasal spray 1 spray by Nasal route as needed for Congestion.     No current facility-administered medications for this visit.        Allergies: Patient has No Known Allergies.    HPI  Review of Systems   Constitutional: Negative.    HENT: Positive for congestion, ear pain, hearing loss, rhinorrhea, sinus pressure and tinnitus.  Negative for ear discharge.    Eyes: Positive for itching.   Neurological: Negative for dizziness, seizures, syncope, facial asymmetry, speech difficulty, weakness, light-headedness and headaches.       Objective:      Physical Exam  VV: Pt has all shiners/ kristina nose.      Assessment:       1. Non-seasonal allergic rhinitis, unspecified trigger    2. Dysfunction of right eustachian tube    3. Otalgia of both ears        Plan:         Diagnoses and all orders for this visit:    Non-seasonal allergic rhinitis, unspecified trigger    Dysfunction of right eustachian tube    Otalgia of both ears    Other orders  -     amoxicillin (AMOXIL) 500 MG Tab; Take 1 tablet (500 mg total) by mouth 2 (two) times daily. for 10 days  -     predniSONE (DELTASONE) 5 MG tablet; Take 6 the first day, then 5 the second, 4 the third, then 3, 2 and one.        OTC nasal inh/ Antihist.    May need All eval/ office visit ? PET if no better.    RTC prn.

## 2020-05-19 ENCOUNTER — PATIENT OUTREACH (OUTPATIENT)
Dept: ADMINISTRATIVE | Facility: OTHER | Age: 30
End: 2020-05-19

## 2020-05-20 ENCOUNTER — OFFICE VISIT (OUTPATIENT)
Dept: OTOLARYNGOLOGY | Facility: CLINIC | Age: 30
End: 2020-05-20
Payer: COMMERCIAL

## 2020-05-20 ENCOUNTER — CLINICAL SUPPORT (OUTPATIENT)
Dept: AUDIOLOGY | Facility: CLINIC | Age: 30
End: 2020-05-20
Payer: COMMERCIAL

## 2020-05-20 VITALS — BODY MASS INDEX: 27.83 KG/M2 | WEIGHT: 177.69 LBS

## 2020-05-20 DIAGNOSIS — H60.8X1 CHRONIC ECZEMATOUS OTITIS EXTERNA OF RIGHT EAR: ICD-10-CM

## 2020-05-20 DIAGNOSIS — H69.92 DYSFUNCTION OF LEFT EUSTACHIAN TUBE: ICD-10-CM

## 2020-05-20 DIAGNOSIS — H92.01 RIGHT EAR PAIN: ICD-10-CM

## 2020-05-20 DIAGNOSIS — H69.90 DYSFUNCTION OF EUSTACHIAN TUBE, UNSPECIFIED LATERALITY: Primary | ICD-10-CM

## 2020-05-20 DIAGNOSIS — H93.8X1 SENSATION OF PLUGGED EAR ON RIGHT SIDE: Primary | ICD-10-CM

## 2020-05-20 DIAGNOSIS — M26.609 TMJ (TEMPOROMANDIBULAR JOINT SYNDROME): ICD-10-CM

## 2020-05-20 PROCEDURE — 99214 PR OFFICE/OUTPT VISIT, EST, LEVL IV, 30-39 MIN: ICD-10-PCS | Mod: S$GLB,,, | Performed by: OTOLARYNGOLOGY

## 2020-05-20 PROCEDURE — 99999 PR PBB SHADOW E&M-EST. PATIENT-LVL III: ICD-10-PCS | Mod: PBBFAC,,, | Performed by: OTOLARYNGOLOGY

## 2020-05-20 PROCEDURE — 92557 PR COMPREHENSIVE HEARING TEST: ICD-10-PCS | Mod: S$GLB,,, | Performed by: AUDIOLOGIST

## 2020-05-20 PROCEDURE — 99214 OFFICE O/P EST MOD 30 MIN: CPT | Mod: S$GLB,,, | Performed by: OTOLARYNGOLOGY

## 2020-05-20 PROCEDURE — 92567 TYMPANOMETRY: CPT | Mod: S$GLB,,, | Performed by: AUDIOLOGIST

## 2020-05-20 PROCEDURE — 99999 PR PBB SHADOW E&M-EST. PATIENT-LVL III: CPT | Mod: PBBFAC,,, | Performed by: OTOLARYNGOLOGY

## 2020-05-20 PROCEDURE — 92567 PR TYMPA2METRY: ICD-10-PCS | Mod: S$GLB,,, | Performed by: AUDIOLOGIST

## 2020-05-20 PROCEDURE — 92557 COMPREHENSIVE HEARING TEST: CPT | Mod: S$GLB,,, | Performed by: AUDIOLOGIST

## 2020-05-20 NOTE — LETTER
May 20, 2020      Daniel Mora MD  [address]  [facility] [address]           [facility] - Otorhinolaryngology  [address]  [facility] [address]  Phone: [phone]  Fax: [phone]          Patient: Amanuel Jimenez   MR Number: [id number]   YOB: 1990   Date of Visit: 5/20/2020       Dear Dr. Daniel Mora:    Thank you for referring Amanuel Jimenez to me for evaluation. Attached you will find relevant portions of my assessment and plan of care.    If you have questions, please do not hesitate to call me. I look forward to following Amanuel Jimenez along with you.    Sincerely,    Maicol Reyes MD    Enclosure  CC:  No Recipients    If you would like to receive this communication electronically, please contact [email or web address] or [phone] to request more information on [employer] Link access.    For providers and/or their staff who would like to refer a patient to [facility], please contact us through our one-stop-shop provider referral line, [facility], at [phone].    If you feel you have received this communication in error or would no longer like to receive these types of communications, please e-mail [email or web address]

## 2020-05-20 NOTE — PROGRESS NOTES
Subjective:       Patient ID: Amanuel Jimenez is a 30 y.o. male.    Chief Complaint:   31 y/o M presents for follow up. His main complaint today is aural fullness of the right ear. He says he has been dealing with this for the past year and a half. He also notices diminished hearing in this ear. Denies any otorrhea. He states he intermittently has problems with his balance and tinnitus. No pain in his ear now. History of BMT as a child. Has been prescribed Flonase, but is not taking it consistently. He was prescribed a course of amoxicillin and prednisone during his last virtual visit with some relief. Intermittent AD ear canal itchiness.      Past Medical History: Patient has a past medical history of Rectal fistula.    Past Surgical History: Patient has a past surgical history that includes cspan; Colonoscopy (N/A, 5/26/2017); and rectal fistula repair.    Social History: Patient reports that he quit smoking about 15 months ago. His smoking use included cigarettes. He smoked 0.50 packs per day. He has never used smokeless tobacco. He reports that he drinks alcohol.    Family History: family history includes Cancer in his father.    Medications:   Current Outpatient Medications   Medication Sig    albuterol (PROVENTIL/VENTOLIN HFA) 90 mcg/actuation inhaler Inhale 1-2 puffs into the lungs every 4 (four) hours as needed for Wheezing or Shortness of Breath. Rescue    guaiFENesin (MUCINEX) 600 mg 12 hr tablet Take 1,200 mg by mouth 2 (two) times daily.    loratadine (CLARITIN) 5 mg chewable tablet Take 5 mg by mouth once daily.    sodium chloride (SALINE NASAL) 0.65 % nasal spray 1 spray by Nasal route as needed for Congestion.     No current facility-administered medications for this visit.        Allergies: Patient has No Known Allergies.    Ear Fullness    Associated symptoms include coughing, hearing loss and rhinorrhea. Pertinent negatives include no ear discharge or headaches.   Otalgia    Associated symptoms  include coughing, hearing loss and rhinorrhea. Pertinent negatives include no ear discharge or headaches.     Review of Systems   Constitutional: Negative.    HENT: Positive for congestion, ear pain, hearing loss, rhinorrhea, sinus pressure and tinnitus. Negative for ear discharge.    Eyes: Positive for itching.   Respiratory: Positive for cough and wheezing.    Neurological: Negative for dizziness, seizures, syncope, facial asymmetry, speech difficulty, weakness, light-headedness and headaches.         Answers for HPI/ROS submitted by the patient on 5/18/2020   Snoring?: Yes    Objective:      Physical Exam   Constitutional: He is oriented to person, place, and time. He appears well-developed and well-nourished. No distress.   HENT:   Head: Normocephalic and atraumatic.   Right Ear: Tympanic membrane, external ear and ear canal normal. Tympanic membrane is not erythematous.   Left Ear: Tympanic membrane, external ear and ear canal normal. Tympanic membrane is not erythematous.       R EAC with DARRYL.    R TMJ ++ tender.    Lowery to L.         Eyes: Pupils are equal, round, and reactive to light. Conjunctivae and EOM are normal.   Neck: Normal range of motion.   Cardiovascular: Normal rate.   Pulmonary/Chest: Effort normal.   Musculoskeletal: Normal range of motion.   Neurological: He is alert and oriented to person, place, and time.   Skin: Skin is warm and dry.     R TMJ joint tenderness with palpation.        Lowery lateralizes to left.    Assessment:       1. Sensation of plugged ear on right side    2. Right ear pain    3. TMJ (temporomandibular joint syndrome)        Plan:         Amanuel was seen today for ear fullness and otalgia.    Diagnoses and all orders for this visit:    Sensation of plugged ear on right side    Right ear pain  -     Ambulatory referral/consult to ENT    - Continue Flonase, use advil or heating pad with ear pain/TMJ pain, soft diet  - TMJ pamphlet provided for patient  - OTC 1%  hydrocortisone cream for intermittent ear itchiness  - RTC prn    Amanuel was seen today for ear fullness and otalgia.    Diagnoses and all orders for this visit:    Sensation of plugged ear on right side    Right ear pain  -     Ambulatory referral/consult to ENT    TMJ (temporomandibular joint syndrome)    Chronic eczematous otitis externa of right ear    Dysfunction of left eustachian tube    Asymmetrical hearing loss of left ear

## 2020-05-20 NOTE — PROGRESS NOTES
Amanuel Jimenez was seen today for a hearing evaluation.     Pure tone audiometry revealed normal hearing for the right ear; mild low frequency hearing loss for the left ear  SRT and PTA are in good agreement bilaterally.  Excellent speech discrimination for the right ear: Excellent for the left ear   Tympanometry revealed Type C, bilaterally      Recommendations:  1. Otologic Evaluation  2. Repeat audiogram as needed  3. Hearing Protection

## 2020-06-12 ENCOUNTER — TELEPHONE (OUTPATIENT)
Dept: PRIMARY CARE CLINIC | Facility: CLINIC | Age: 30
End: 2020-06-12

## 2020-06-12 ENCOUNTER — PATIENT MESSAGE (OUTPATIENT)
Dept: PRIMARY CARE CLINIC | Facility: CLINIC | Age: 30
End: 2020-06-12

## 2020-06-12 DIAGNOSIS — R05.9 COUGH: ICD-10-CM

## 2020-06-12 NOTE — TELEPHONE ENCOUNTER
----- Message from Sally Torrez sent at 6/12/2020  9:54 AM CDT -----  Contact: self    Patient called in regards to getting tested for the covid-19 drive test up test patient states he has been around his boss that has tested positive  for the virus and he is now not feeling with a cough and fever body aches please advise

## 2021-03-10 ENCOUNTER — OFFICE VISIT (OUTPATIENT)
Dept: PRIMARY CARE CLINIC | Facility: CLINIC | Age: 31
End: 2021-03-10
Payer: COMMERCIAL

## 2021-03-10 DIAGNOSIS — J30.9 CHRONIC ALLERGIC RHINITIS: ICD-10-CM

## 2021-03-10 DIAGNOSIS — F17.200 SMOKER: ICD-10-CM

## 2021-03-10 DIAGNOSIS — J45.20 MILD INTERMITTENT ASTHMA WITHOUT COMPLICATION: ICD-10-CM

## 2021-03-10 DIAGNOSIS — R21 RASH: Primary | ICD-10-CM

## 2021-03-10 PROBLEM — J45.40 MODERATE PERSISTENT ASTHMA WITHOUT COMPLICATION: Status: RESOLVED | Noted: 2018-11-19 | Resolved: 2021-03-10

## 2021-03-10 PROCEDURE — 99213 PR OFFICE/OUTPT VISIT, EST, LEVL III, 20-29 MIN: ICD-10-PCS | Mod: 95,,, | Performed by: FAMILY MEDICINE

## 2021-03-10 PROCEDURE — 99213 OFFICE O/P EST LOW 20 MIN: CPT | Mod: 95,,, | Performed by: FAMILY MEDICINE

## 2021-03-10 RX ORDER — ALBUTEROL SULFATE 90 UG/1
1-2 AEROSOL, METERED RESPIRATORY (INHALATION) EVERY 4 HOURS PRN
Qty: 18 G | Refills: 11 | Status: SHIPPED | OUTPATIENT
Start: 2021-03-10 | End: 2021-03-10

## 2021-03-10 RX ORDER — ALBUTEROL SULFATE 90 UG/1
2 AEROSOL, METERED RESPIRATORY (INHALATION) EVERY 6 HOURS PRN
Qty: 18 G | Refills: 11 | Status: SHIPPED | OUTPATIENT
Start: 2021-03-10 | End: 2022-03-10

## 2021-03-11 ENCOUNTER — OFFICE VISIT (OUTPATIENT)
Dept: DERMATOLOGY | Facility: CLINIC | Age: 31
End: 2021-03-11

## 2021-03-11 ENCOUNTER — PATIENT MESSAGE (OUTPATIENT)
Dept: PRIMARY CARE CLINIC | Facility: CLINIC | Age: 31
End: 2021-03-11

## 2021-03-11 DIAGNOSIS — B36.0 TINEA VERSICOLOR: Primary | ICD-10-CM

## 2021-03-11 DIAGNOSIS — Z76.89 ENCOUNTER FOR SKIN CARE: ICD-10-CM

## 2021-03-11 DIAGNOSIS — L81.9 DYSCHROMIA: ICD-10-CM

## 2021-03-11 DIAGNOSIS — R21 RASH: ICD-10-CM

## 2021-03-11 DIAGNOSIS — L21.9 SEBORRHEA: ICD-10-CM

## 2021-03-11 DIAGNOSIS — L53.9 ERYTHEMA: ICD-10-CM

## 2021-03-11 PROCEDURE — 99213 OFFICE O/P EST LOW 20 MIN: CPT | Mod: PBBFAC,PN | Performed by: DERMATOLOGY

## 2021-03-11 PROCEDURE — 99204 PR OFFICE/OUTPT VISIT, NEW, LEVL IV, 45-59 MIN: ICD-10-PCS | Mod: S$PBB,,, | Performed by: DERMATOLOGY

## 2021-03-11 PROCEDURE — 99204 OFFICE O/P NEW MOD 45 MIN: CPT | Mod: S$PBB,,, | Performed by: DERMATOLOGY

## 2021-03-11 PROCEDURE — 99999 PR PBB SHADOW E&M-EST. PATIENT-LVL III: ICD-10-PCS | Mod: PBBFAC,,, | Performed by: DERMATOLOGY

## 2021-03-11 PROCEDURE — 99999 PR PBB SHADOW E&M-EST. PATIENT-LVL III: CPT | Mod: PBBFAC,,, | Performed by: DERMATOLOGY

## 2021-03-11 RX ORDER — SELENIUM SULFIDE 22.5 MG/ML
SHAMPOO TOPICAL
Qty: 180 ML | Refills: 5 | Status: SHIPPED | OUTPATIENT
Start: 2021-03-11 | End: 2021-03-11

## 2021-03-11 RX ORDER — SELENIUM SULFIDE 22.5 MG/ML
SHAMPOO TOPICAL
Qty: 180 ML | Refills: 5 | Status: SHIPPED | OUTPATIENT
Start: 2021-03-11 | End: 2021-04-25 | Stop reason: SDUPTHER

## 2022-03-01 NOTE — TELEPHONE ENCOUNTER
----- Message from Brianne Ward LPN sent at 5/24/2017  1:46 PM CDT -----  Contact: self/[phone]      ----- Message -----  From: Saravanan Morin  Sent: 5/24/2017   9:29 AM  To: [facility] Staff    Patient called to find out about a prescription that Dr. Sharif told him he would sent to the pharmacy on yesterday.    Please advise.  
Script sent  
Negative

## (undated) DEVICE — PAD ABDOMINAL 5X9 STERILE

## (undated) DEVICE — JELLY LUBRICANT STERILE 4 OZ

## (undated) DEVICE — SUT CHROMIC 2-0 SH 27IN BRN

## (undated) DEVICE — SEE MEDLINE ITEM 146355

## (undated) DEVICE — SUT 0 18IN SILK BLK BRAIDE

## (undated) DEVICE — MANIFOLD 4 PORT

## (undated) DEVICE — SYR 10CC LUER LOCK

## (undated) DEVICE — UNDERGLOVE BIOGEL PI SZ 6.5 LF

## (undated) DEVICE — SUT SILK 0 STRANDS 30IN BLK

## (undated) DEVICE — SEE MEDLINE ITEM 154981

## (undated) DEVICE — SWABSTICK BENZOIN 4 IN

## (undated) DEVICE — NDL HYPO REG 25G X 1 1/2

## (undated) DEVICE — BLADE SURG #15 CARBON STEEL

## (undated) DEVICE — SCRUB 10% POVIDONE IODINE 4OZ

## (undated) DEVICE — PACK BASIC

## (undated) DEVICE — SUT 2-0 VICRYL / SH (J417)

## (undated) DEVICE — SEE MEDLINE ITEM 152622

## (undated) DEVICE — ELECTRODE REM PLYHSV RETURN 9

## (undated) DEVICE — SEE MEDLINE ITEM 157116

## (undated) DEVICE — COVER OVERHEAD SURG LT BLUE

## (undated) DEVICE — TAPE SILK 3IN

## (undated) DEVICE — SEE MEDLINE ITEM 156955

## (undated) DEVICE — GLOVE BIOGEL ECLIPSE SZ 6

## (undated) DEVICE — PAD PREP 50/CA

## (undated) DEVICE — SPONGE LAP 18X18 PREWASHED

## (undated) DEVICE — PANTIES FEMININE NAPKIN LG/XLG